# Patient Record
Sex: MALE | Race: WHITE | NOT HISPANIC OR LATINO | Employment: OTHER | URBAN - METROPOLITAN AREA
[De-identification: names, ages, dates, MRNs, and addresses within clinical notes are randomized per-mention and may not be internally consistent; named-entity substitution may affect disease eponyms.]

---

## 2018-04-12 ENCOUNTER — OFFICE VISIT (OUTPATIENT)
Dept: FAMILY MEDICINE | Facility: CLINIC | Age: 83
End: 2018-04-12
Payer: MEDICARE

## 2018-04-12 VITALS
HEART RATE: 70 BPM | RESPIRATION RATE: 18 BRPM | OXYGEN SATURATION: 100 % | SYSTOLIC BLOOD PRESSURE: 142 MMHG | DIASTOLIC BLOOD PRESSURE: 87 MMHG

## 2018-04-12 DIAGNOSIS — R21 RASH: Primary | ICD-10-CM

## 2018-04-12 PROCEDURE — 99202 OFFICE O/P NEW SF 15 MIN: CPT | Mod: PBBFAC,PN | Performed by: FAMILY MEDICINE

## 2018-04-12 PROCEDURE — 99213 OFFICE O/P EST LOW 20 MIN: CPT | Mod: S$PBB,,, | Performed by: FAMILY MEDICINE

## 2018-04-12 PROCEDURE — 99999 PR PBB SHADOW E&M-NEW PATIENT-LVL II: CPT | Mod: PBBFAC,,, | Performed by: FAMILY MEDICINE

## 2018-04-12 RX ORDER — VENLAFAXINE 37.5 MG/1
TABLET ORAL
COMMUNITY
End: 2019-02-08 | Stop reason: SDUPTHER

## 2018-04-12 RX ORDER — BACLOFEN 10 MG/1
TABLET ORAL
COMMUNITY
End: 2020-05-15

## 2018-04-12 RX ORDER — FERROUS SULFATE 325(65) MG
TABLET ORAL
COMMUNITY
End: 2020-05-15

## 2018-04-12 RX ORDER — HYDROCORTISONE 25 MG/G
CREAM TOPICAL
COMMUNITY
Start: 2018-03-15 | End: 2020-05-15

## 2018-04-12 RX ORDER — NYSTATIN 100000 U/G
OINTMENT TOPICAL 2 TIMES DAILY
Qty: 30 G | Refills: 1 | Status: ON HOLD | OUTPATIENT
Start: 2018-04-12 | End: 2020-10-24

## 2018-04-12 RX ORDER — CYANOCOBALAMIN (VITAMIN B-12) 1000MCG/15
LIQUID (ML) ORAL
COMMUNITY

## 2018-04-12 RX ORDER — TRIAMCINOLONE ACETONIDE 1 MG/G
CREAM TOPICAL
COMMUNITY
End: 2020-05-15

## 2018-04-12 RX ORDER — NYSTATIN 100000 [USP'U]/G
POWDER TOPICAL
COMMUNITY
End: 2018-04-12

## 2018-04-12 RX ORDER — ATORVASTATIN CALCIUM 20 MG/1
TABLET, FILM COATED ORAL
COMMUNITY
End: 2018-10-23 | Stop reason: SDUPTHER

## 2018-04-12 RX ORDER — AMLODIPINE BESYLATE 5 MG/1
TABLET ORAL
COMMUNITY
End: 2018-10-23 | Stop reason: SDUPTHER

## 2018-04-12 RX ORDER — IBUPROFEN 600 MG/1
TABLET ORAL
COMMUNITY
End: 2020-05-15

## 2018-04-12 NOTE — PROGRESS NOTES
Subjective:       Patient ID: Naty Hope is a 83 y.o. male.    Chief Complaint: Rash (x6 months)    Complains of red rash in groin region, pruritic, no fever, no drainage.      Review of Systems   Constitutional: Negative for activity change, appetite change, fatigue and fever.   HENT: Negative for congestion, dental problem, ear discharge, hearing loss, postnasal drip, sinus pain, sneezing and trouble swallowing.    Eyes: Negative for photophobia and discharge.   Respiratory: Negative for apnea, cough and shortness of breath.    Cardiovascular: Negative for chest pain.   Gastrointestinal: Negative for abdominal distention, abdominal pain, blood in stool, diarrhea, nausea and vomiting.   Endocrine: Negative for cold intolerance.   Genitourinary: Negative for difficulty urinating, flank pain, frequency, hematuria and testicular pain.   Musculoskeletal: Negative for arthralgias and myalgias.   Skin: Positive for rash. Negative for color change.   Allergic/Immunologic: Negative for environmental allergies, food allergies and immunocompromised state.   Neurological: Negative for dizziness, syncope, numbness and headaches.   Hematological: Negative for adenopathy. Does not bruise/bleed easily.   Psychiatric/Behavioral: Negative for agitation, confusion, decreased concentration, hallucinations, self-injury and sleep disturbance.   All other systems reviewed and are negative.      Past Medical History:   Diagnosis Date    Chronic fatigue     Hypertension      Past Surgical History:   Procedure Laterality Date    CHOLECYSTECTOMY      HERNIA REPAIR       Social History     Social History    Marital status:      Spouse name: N/A    Number of children: N/A    Years of education: N/A     Occupational History    Not on file.     Social History Main Topics    Smoking status: Never Smoker    Smokeless tobacco: Not on file    Alcohol use Yes    Drug use: No    Sexual activity: Not on file     Other  Topics Concern    Not on file     Social History Narrative    No narrative on file     No family history on file.    Objective:      BP (!) 142/87 (BP Location: Right arm, Patient Position: Sitting)   Pulse 70   Resp 18   SpO2 100%   Physical Exam   Constitutional: He is oriented to person, place, and time. He appears well-developed and well-nourished. No distress.   HENT:   Head: Normocephalic and atraumatic.   Nose: Nose normal.   Mouth/Throat: Oropharynx is clear and moist. No oropharyngeal exudate.   Eyes: Conjunctivae and EOM are normal. Pupils are equal, round, and reactive to light.   Neck: Normal range of motion. No thyromegaly present.   Cardiovascular: Normal rate, regular rhythm, normal heart sounds and intact distal pulses.  Exam reveals no gallop and no friction rub.    No murmur heard.  Pulmonary/Chest: Effort normal and breath sounds normal. No respiratory distress. He has no wheezes. He has no rales.   Abdominal: Soft. He exhibits no distension. There is no tenderness. There is no guarding.   Musculoskeletal: Normal range of motion. He exhibits no edema, tenderness or deformity.   Neurological: He is alert and oriented to person, place, and time. He displays normal reflexes. No cranial nerve deficit or sensory deficit. He exhibits normal muscle tone. Coordination normal.   Skin: Skin is warm and dry. Rash noted. He is not diaphoretic. No erythema. No pallor.   Psychiatric: He has a normal mood and affect. His behavior is normal. Judgment and thought content normal.   Nursing note and vitals reviewed.      Assessment:       1. Rash        Plan:       Rash  -     nystatin (MYCOSTATIN) ointment; Apply topically 2 (two) times daily.  Dispense: 30 g; Refill: 1            Risks, benefits, and side effects were discussed with the patient. All questions were answered to the fullest satisfaction of the patient, and pt verbalized understanding and agreement to treatment plan. Pt was to call with any new  or worsening symptoms, or present to the ER.

## 2018-10-23 RX ORDER — ATORVASTATIN CALCIUM 20 MG/1
20 TABLET, FILM COATED ORAL DAILY
Qty: 90 TABLET | Refills: 3 | Status: SHIPPED | OUTPATIENT
Start: 2018-10-23 | End: 2020-02-10

## 2018-10-23 RX ORDER — AMLODIPINE BESYLATE 5 MG/1
5 TABLET ORAL DAILY
Qty: 90 TABLET | Refills: 3 | Status: SHIPPED | OUTPATIENT
Start: 2018-10-23 | End: 2019-11-08 | Stop reason: SDUPTHER

## 2019-02-04 ENCOUNTER — OFFICE VISIT (OUTPATIENT)
Dept: FAMILY MEDICINE | Facility: CLINIC | Age: 84
End: 2019-02-04
Payer: MEDICARE

## 2019-02-04 VITALS
OXYGEN SATURATION: 97 % | BODY MASS INDEX: 25.2 KG/M2 | DIASTOLIC BLOOD PRESSURE: 83 MMHG | WEIGHT: 180 LBS | HEIGHT: 71 IN | HEART RATE: 69 BPM | SYSTOLIC BLOOD PRESSURE: 142 MMHG | RESPIRATION RATE: 20 BRPM

## 2019-02-04 DIAGNOSIS — R21 RASH: Primary | ICD-10-CM

## 2019-02-04 PROCEDURE — 99999 PR PBB SHADOW E&M-EST. PATIENT-LVL III: ICD-10-PCS | Mod: PBBFAC,,, | Performed by: NURSE PRACTITIONER

## 2019-02-04 PROCEDURE — 99213 OFFICE O/P EST LOW 20 MIN: CPT | Mod: PBBFAC,PN | Performed by: NURSE PRACTITIONER

## 2019-02-04 PROCEDURE — 99213 OFFICE O/P EST LOW 20 MIN: CPT | Mod: S$PBB,,, | Performed by: NURSE PRACTITIONER

## 2019-02-04 PROCEDURE — 99213 PR OFFICE/OUTPT VISIT, EST, LEVL III, 20-29 MIN: ICD-10-PCS | Mod: S$PBB,,, | Performed by: NURSE PRACTITIONER

## 2019-02-04 PROCEDURE — 99999 PR PBB SHADOW E&M-EST. PATIENT-LVL III: CPT | Mod: PBBFAC,,, | Performed by: NURSE PRACTITIONER

## 2019-02-04 RX ORDER — METHYLPREDNISOLONE 4 MG/1
TABLET ORAL
Qty: 1 PACKAGE | Refills: 0 | Status: SHIPPED | OUTPATIENT
Start: 2019-02-04 | End: 2019-02-25

## 2019-02-04 RX ORDER — DEXAMETHASONE SODIUM PHOSPHATE 4 MG/ML
8 INJECTION, SOLUTION INTRA-ARTICULAR; INTRALESIONAL; INTRAMUSCULAR; INTRAVENOUS; SOFT TISSUE
Status: DISCONTINUED | OUTPATIENT
Start: 2019-02-04 | End: 2020-05-15

## 2019-02-04 RX ORDER — KETOCONAZOLE 20 MG/G
CREAM TOPICAL DAILY
Qty: 2 TUBE | Refills: 0 | Status: SHIPPED | OUTPATIENT
Start: 2019-02-04 | End: 2020-05-15

## 2019-02-04 NOTE — PROGRESS NOTES
Chief Complaint  Chief Complaint   Patient presents with    Rash     legs and back       HPI:  Naty Hope is a 83 y.o. male with medical diagnoses as listed and reviewed within the medical history and problem list that presents for complaints of a rash to both legs at the ankle and to both hips. The rash is not red, but does itch and is slightly raised. He reports that he isn't sure if he was exposed to anything, but [place]. He does not have any hives or other areas that is seen.     PAST MEDICAL HISTORY:  Past Medical History:   Diagnosis Date    Chronic fatigue     Hypertension        PAST SURGICAL HISTORY:  Past Surgical History:   Procedure Laterality Date    CHOLECYSTECTOMY      HERNIA REPAIR         SOCIAL HISTORY:  Social History     Socioeconomic History    Marital status:      Spouse name: Not on file    Number of children: Not on file    Years of education: Not on file    Highest education level: Not on file   Social Needs    Financial resource strain: Not on file    Food insecurity - worry: Not on file    Food insecurity - inability: Not on file    Transportation needs - medical: Not on file    Transportation needs - non-medical: Not on file   Occupational History    Not on file   Tobacco Use    Smoking status: Never Smoker   Substance and Sexual Activity    Alcohol use: Yes    Drug use: No    Sexual activity: Not on file   Other Topics Concern    Not on file   Social History Narrative    Not on file       FAMILY HISTORY:  History reviewed. No pertinent family history.    ALLERGIES AND MEDICATIONS: updated and reviewed.  Review of patient's allergies indicates:  No Known Allergies  Current Outpatient Medications   Medication Sig Dispense Refill    amLODIPine (NORVASC) 5 MG tablet Take 1 tablet (5 mg total) by mouth once daily. 90 tablet 3    atorvastatin (LIPITOR) 20 MG tablet Take 1 tablet (20 mg total) by mouth once daily. 90 tablet 3  "   venlafaxine (EFFEXOR) 37.5 MG Tab venlafaxine 37.5 mg tablet      AMLODIPINE/ATORVASTATIN (CADUET ORAL) Take by mouth.      baclofen (LIORESAL) 10 MG tablet baclofen 10 mg tablet      cyanocobalamin, vitamin B-12, 1,000 mcg/15 mL Liqd cyanocobalamin (vit B-12) 1,000 mcg/mL injection solution   give 1 milliliter intramuscularly EVERY MONTH      DESVENLAFAXINE SUCCINATE (PRISTIQ ORAL) Take by mouth.      ferrous sulfate 325 mg (65 mg iron) Tab tablet ferrous sulfate 325 mg (65 mg iron) tablet   TAKE 1 TABLET THREE TIMES A DAY      hydrocortisone 2.5 % cream       ibuprofen (ADVIL,MOTRIN) 600 MG tablet ibuprofen 600 mg tablet   TAKE 1 TABLET EVERY 8 HOURS WITH FOOD NOT TO EXCEED 3200MG PER DAY      ketoconazole (NIZORAL) 2 % cream Apply topically once daily. 2 Tube 0    methylPREDNISolone (MEDROL DOSEPACK) 4 mg tablet use as directed 1 Package 0    nystatin (MYCOSTATIN) ointment Apply topically 2 (two) times daily. 30 g 1    syringe with needle (BD LUER-TORREY SYRINGE) 3 mL 25 gauge x 1" Syrg BD Luer-Torrey Syringe 3 mL 25 gauge x 1"   inject as directed      triamcinolone acetonide 0.1% (KENALOG) 0.1 % cream triamcinolone acetonide 0.1 % topical cream       Current Facility-Administered Medications   Medication Dose Route Frequency Provider Last Rate Last Dose    dexamethasone injection 8 mg  8 mg Intramuscular 1 time in Clinic/HOD [place] NP             ROS  Review of Systems   Constitutional: Negative for appetite change, chills, fatigue and fever.   HENT: Negative for congestion, postnasal drip, rhinorrhea and sore throat.    Respiratory: Negative for cough, chest tightness, shortness of breath and wheezing.    Cardiovascular: Negative for chest pain and palpitations.   Gastrointestinal: Negative for abdominal distention, abdominal pain, constipation, diarrhea, nausea and vomiting.   Genitourinary: Negative for dysuria.   Musculoskeletal: Negative for myalgias.   Skin: Positive for rash. " "Negative for color change.   Neurological: Negative for dizziness, weakness and headaches.   Psychiatric/Behavioral: Negative for confusion and sleep disturbance. The patient is not nervous/anxious.            PHYSICAL EXAM  Vitals:    02/04/19 1515   BP: (!) 142/83   BP Location: Left arm   Patient Position: Sitting   Pulse: 69   Resp: 20   SpO2: 97%   Weight: 81.6 kg (180 lb)   Height: 5' 11" (1.803 m)    Body mass index is 25.1 kg/m².  Weight: 81.6 kg (180 lb)   Height: 5' 11" (180.3 cm)       Physical Exam   Constitutional: He is oriented to person, place, and time. He appears well-developed and well-nourished.   HENT:   Head: Normocephalic.   Eyes: Pupils are equal, round, and reactive to light.   Neck: Normal range of motion. Neck supple.   Cardiovascular: Normal rate, S1 normal and S2 normal.   Pulmonary/Chest: Effort normal.   Abdominal: Normal appearance.   Musculoskeletal: Normal range of motion.   Neurological: He is alert and oriented to person, place, and time.   Skin: Skin is warm and dry. Capillary refill takes less than 2 seconds.        Psychiatric: He has a normal mood and affect. His speech is normal and behavior is normal. Thought content normal. Cognition and memory are normal.   Vitals reviewed.        Health Maintenance       Date Due Completion Date    TETANUS VACCINE 02/12/1953 ---    Zoster Vaccine 02/12/1997 ---    Pneumococcal Vaccine (65+ Low/Medium Risk) (1 of 2 - PCV13) 02/12/2002 ---    Influenza Vaccine 08/01/2018 ---    Lipid Panel 11/21/2021 11/21/2016               Assessment & Plan    Naty was seen today for rash.    Diagnoses and all orders for this visit:    Rash  -     dexamethasone injection 8 mg  -     methylPREDNISolone (MEDROL DOSEPACK) 4 mg tablet; use as directed  -     ketoconazole (NIZORAL) 2 % cream; Apply topically once daily.        Follow-up: Follow-up if symptoms worsen or fail to improve.      Risks, benefits, and side effects were discussed with the patient. " All questions were answered to the fullest satisfaction of the patient, and pt verbalized understanding and agreement to treatment plan. Pt was to call with any new or worsening symptoms, or present to the ER.

## 2019-02-08 RX ORDER — VENLAFAXINE 37.5 MG/1
TABLET ORAL
Qty: 30 TABLET | Refills: 6 | Status: SHIPPED | OUTPATIENT
Start: 2019-02-08 | End: 2019-04-12 | Stop reason: SDUPTHER

## 2019-04-12 RX ORDER — VENLAFAXINE 37.5 MG/1
TABLET ORAL
Qty: 90 TABLET | Refills: 3 | Status: SHIPPED | OUTPATIENT
Start: 2019-04-12 | End: 2020-05-15 | Stop reason: SDUPTHER

## 2019-11-08 RX ORDER — AMLODIPINE BESYLATE 5 MG/1
TABLET ORAL
Qty: 270 TABLET | Refills: 3 | Status: SHIPPED | OUTPATIENT
Start: 2019-11-08 | End: 2020-05-15 | Stop reason: SDUPTHER

## 2019-12-03 ENCOUNTER — CLINICAL SUPPORT (OUTPATIENT)
Dept: FAMILY MEDICINE | Facility: CLINIC | Age: 84
End: 2019-12-03
Payer: MEDICARE

## 2019-12-03 NOTE — PROGRESS NOTES
"Patient brought to room #4, brought picture from [facility] of impacted wax in Lt ear that needs removal for fitting of hearing aide.  Patient draped, procedure explained to patient to decrease any apprehension, Lt ear flushed with warm water and a dime size dark brown hard piece of ? Wax was flushed from ear canal, patient stated he felt relief instantly "like something popped out". Rt ear was rinsed also with a small amount of light yellowish debris noted in basin. Patient satisfied with results. Denies complaints, assisted off exam table, encouraged to call with any other problems or complaints. Equipment cleaned per protocol and placed to dry.  "

## 2020-02-10 RX ORDER — ATORVASTATIN CALCIUM 20 MG/1
TABLET, FILM COATED ORAL
Qty: 90 TABLET | Refills: 3 | Status: SHIPPED | OUTPATIENT
Start: 2020-02-10 | End: 2020-05-15 | Stop reason: SDUPTHER

## 2020-03-16 ENCOUNTER — IMMUNIZATION (OUTPATIENT)
Dept: FAMILY MEDICINE | Facility: CLINIC | Age: 85
End: 2020-03-16
Payer: MEDICARE

## 2020-03-16 PROCEDURE — 99999 PR PBB SHADOW E&M-EST. PATIENT-LVL I: CPT | Mod: PBBFAC,,,

## 2020-03-16 PROCEDURE — 99211 OFF/OP EST MAY X REQ PHY/QHP: CPT | Mod: PBBFAC,PN

## 2020-03-16 PROCEDURE — 99999 PR PBB SHADOW E&M-EST. PATIENT-LVL I: ICD-10-PCS | Mod: PBBFAC,,,

## 2020-03-16 NOTE — PROGRESS NOTES
Irrigated bilateral ear without incident.    Advised patient that he can purchase OTC ear irrigation tools, use warm water, and peroxide for home ear irrigation.    Patient verbalized understanding.

## 2020-05-14 ENCOUNTER — TELEPHONE (OUTPATIENT)
Dept: FAMILY MEDICINE | Facility: CLINIC | Age: 85
End: 2020-05-14

## 2020-05-14 RX ORDER — ATORVASTATIN CALCIUM 20 MG/1
20 TABLET, FILM COATED ORAL DAILY
Qty: 90 TABLET | Refills: 3 | Status: CANCELLED | OUTPATIENT
Start: 2020-05-14

## 2020-05-14 RX ORDER — AMLODIPINE BESYLATE 5 MG/1
5 TABLET ORAL DAILY
Qty: 270 TABLET | Refills: 3 | Status: CANCELLED | OUTPATIENT
Start: 2020-05-14

## 2020-05-14 RX ORDER — VENLAFAXINE 37.5 MG/1
TABLET ORAL
Qty: 90 TABLET | Refills: 3 | Status: CANCELLED | OUTPATIENT
Start: 2020-05-14

## 2020-05-14 NOTE — TELEPHONE ENCOUNTER
----- Message from Breana Bailey sent at 5/14/2020 12:51 PM CDT -----  Contact: self  Type:  RX Refill Request    Who Called:  self  Refill or New Rx:  refil  RX Name and Strength:    atorvastatin (LIPITOR) 20 MG tablet  venlafaxine (EFFEXOR) 37.5 MG Tab  amLODIPine  How is the patient currently taking it? (ex. 1XDay):    Is this a 30 day or 90 day RX:  90  Preferred Pharmacy with phone number:     [facility] in [place]    Local or Mail Order:  local  Ordering Provider:  Dr Ben Leon Call Back Number:  [phone] (home)   Additional Information:  Please call patient to confirm pharmacy and to discuss medications before sending. Thanks!

## 2020-05-14 NOTE — TELEPHONE ENCOUNTER
----- Message from Azalea Hernandez NP sent at 5/14/2020  1:52 PM CDT -----  He has not been seen in 2 years. He needs a f/u for medication refills

## 2020-05-15 ENCOUNTER — OFFICE VISIT (OUTPATIENT)
Dept: FAMILY MEDICINE | Facility: CLINIC | Age: 85
End: 2020-05-15
Payer: MEDICARE

## 2020-05-15 VITALS
DIASTOLIC BLOOD PRESSURE: 71 MMHG | WEIGHT: 172 LBS | BODY MASS INDEX: 24.08 KG/M2 | SYSTOLIC BLOOD PRESSURE: 111 MMHG | TEMPERATURE: 88 F | OXYGEN SATURATION: 96 % | HEIGHT: 71 IN | HEART RATE: 82 BPM | RESPIRATION RATE: 18 BRPM

## 2020-05-15 DIAGNOSIS — E78.49 OTHER HYPERLIPIDEMIA: Primary | ICD-10-CM

## 2020-05-15 PROCEDURE — 99999 PR PBB SHADOW E&M-EST. PATIENT-LVL III: ICD-10-PCS | Mod: PBBFAC,,, | Performed by: NURSE PRACTITIONER

## 2020-05-15 PROCEDURE — 99213 OFFICE O/P EST LOW 20 MIN: CPT | Mod: PBBFAC,PN | Performed by: NURSE PRACTITIONER

## 2020-05-15 PROCEDURE — 99214 PR OFFICE/OUTPT VISIT, EST, LEVL IV, 30-39 MIN: ICD-10-PCS | Mod: S$PBB,,, | Performed by: NURSE PRACTITIONER

## 2020-05-15 PROCEDURE — 99999 PR PBB SHADOW E&M-EST. PATIENT-LVL III: CPT | Mod: PBBFAC,,, | Performed by: NURSE PRACTITIONER

## 2020-05-15 PROCEDURE — 99214 OFFICE O/P EST MOD 30 MIN: CPT | Mod: S$PBB,,, | Performed by: NURSE PRACTITIONER

## 2020-05-15 RX ORDER — ATORVASTATIN CALCIUM 20 MG/1
20 TABLET, FILM COATED ORAL DAILY
Qty: 90 TABLET | Refills: 3 | Status: SHIPPED | OUTPATIENT
Start: 2020-05-15

## 2020-05-15 RX ORDER — AMLODIPINE BESYLATE 5 MG/1
5 TABLET ORAL DAILY
Qty: 270 TABLET | Refills: 3 | Status: SHIPPED | OUTPATIENT
Start: 2020-05-15

## 2020-05-15 RX ORDER — VENLAFAXINE 37.5 MG/1
TABLET ORAL
Qty: 90 TABLET | Refills: 3 | Status: SHIPPED | OUTPATIENT
Start: 2020-05-15 | End: 2020-05-18 | Stop reason: SDUPTHER

## 2020-05-15 NOTE — PROGRESS NOTES
Subjective:       Patient ID: Naty Hope is a 85 y.o. male.    Chief Complaint: Medication Refill    Mr. Naty Hope is a 85 year old male who presents to the clinic today for medication refill. Hx of hyperlipidemia. Last lipid not up to date. In regards to the patient's dyslipidemia, patient reports has been compliant with the medication regimen  without side effects. Hx of HTN. hypertension well controlled. Goal of <130/80. Meds and labs as per orders. Requesting refill of venlafaxine. Reports medication is effective at improving mood. Denies any si/hi.       HM:  - lipid: ordered  - reports he had 11 vial of blood work completed at Dr. Garcia office. Will fax for results    Review of Systems   Constitutional: Negative for activity change, appetite change, chills, fatigue, fever and unexpected weight change.   HENT: Negative for congestion, ear pain, postnasal drip, sore throat and tinnitus.    Eyes: Negative for pain and visual disturbance.   Respiratory: Negative for apnea, cough, chest tightness, shortness of breath and wheezing.    Cardiovascular: Negative for chest pain, palpitations and leg swelling.   Gastrointestinal: Positive for constipation. Negative for abdominal distention, abdominal pain, blood in stool, diarrhea, nausea and vomiting.   Endocrine: Negative for polydipsia and polyuria.   Genitourinary: Negative for difficulty urinating, flank pain, frequency and urgency.   Musculoskeletal: Negative for arthralgias, back pain, joint swelling and myalgias.   Skin: Negative for color change, pallor and rash.   Allergic/Immunologic: Negative for environmental allergies and food allergies.   Neurological: Negative for dizziness, tremors, syncope, weakness, light-headedness and headaches.   Hematological: Does not bruise/bleed easily.   Psychiatric/Behavioral: Negative for agitation, decreased concentration, self-injury, sleep disturbance and suicidal ideas. The patient is not  "nervous/anxious.          Reviewed family, medical, surgical, and social history.    Objective:      /71 (BP Location: Left arm, Patient Position: Sitting, BP Method: Medium (Automatic))   Pulse 82   Temp (!) 87.9 °F (31.1 °C) (Oral)   Resp 18   Ht 5' 11" (1.803 m)   Wt 78 kg (172 lb)   SpO2 96%   BMI 23.99 kg/m²   Physical Exam   Constitutional: He is oriented to person, place, and time. He appears well-developed and well-nourished. He is cooperative. No distress.   HENT:   Head: Normocephalic and atraumatic.   Nose: Nose normal.   Mouth/Throat: Uvula is midline, oropharynx is clear and moist and mucous membranes are normal. No uvula swelling.   Eyes: Pupils are equal, round, and reactive to light. Conjunctivae, EOM and lids are normal.   Neck: Trachea normal and full passive range of motion without pain. No tracheal tenderness present. No neck rigidity. No thyroid mass present.   Cardiovascular: Normal rate, regular rhythm, S1 normal, S2 normal, normal heart sounds, intact distal pulses and normal pulses.   Pulmonary/Chest: Effort normal and breath sounds normal. No respiratory distress. He has no decreased breath sounds. He has no wheezes.   Abdominal: Soft. Normal appearance and bowel sounds are normal. He exhibits no distension and no abdominal bruit. There is no guarding and no CVA tenderness.   Musculoskeletal: He exhibits no edema, tenderness or deformity.   Lymphadenopathy:     He has no cervical adenopathy.   Neurological: He is alert and oriented to person, place, and time. He has normal strength. He exhibits normal muscle tone.   Skin: Skin is warm, dry and intact. Capillary refill takes less than 2 seconds. No abrasion, no laceration, no lesion and no rash noted. He is not diaphoretic. No cyanosis. Nails show no clubbing.   Psychiatric: He has a normal mood and affect. His speech is normal and behavior is normal. Judgment and thought content normal. Cognition and memory are normal.     "   Assessment:       1. Other hyperlipidemia        Plan:       Other hyperlipidemia  -     Lipid Panel; Future; Expected date: 05/15/2020    Other orders  -     amLODIPine (NORVASC) 5 MG tablet; Take 1 tablet (5 mg total) by mouth once daily.  Dispense: 270 tablet; Refill: 3  -     atorvastatin (LIPITOR) 20 MG tablet; Take 1 tablet (20 mg total) by mouth once daily.  Dispense: 90 tablet; Refill: 3  -     venlafaxine (EFFEXOR) 37.5 MG Tab; venlafaxine 37.5 mg tablet  Dispense: 90 tablet; Refill: 3            Risks, benefits, and side effects were discussed with the patient. All questions were answered to the fullest satisfaction of the patient, and pt verbalized understanding and agreement to treatment plan. Pt was to call with any new or worsening symptoms, or present to the ER.

## 2020-05-18 RX ORDER — VENLAFAXINE 37.5 MG/1
37.5 TABLET ORAL 2 TIMES DAILY
Qty: 60 TABLET | Refills: 3 | Status: SHIPPED | OUTPATIENT
Start: 2020-05-18 | End: 2020-10-24

## 2020-05-20 ENCOUNTER — PATIENT MESSAGE (OUTPATIENT)
Dept: ADMINISTRATIVE | Facility: HOSPITAL | Age: 85
End: 2020-05-20

## 2020-06-11 ENCOUNTER — TELEPHONE (OUTPATIENT)
Dept: CARDIOLOGY | Facility: CLINIC | Age: 85
End: 2020-06-11

## 2020-06-11 NOTE — TELEPHONE ENCOUNTER
----- Message from Ermelinda Vogel sent at 6/11/2020 11:27 AM CDT -----  Contact: Pt  Name of Caller: Naty Hope  Reason for Visit/Symptoms: rash that has not gone away  Best Contact Number or Confirm if Mychart Preferred: callback at [phone]  Preferred Date/Time of Appointment: First available    Interested in Virtual Visit (yes/no): no  Additional Information:  Pt is requesting to see Dr Contreras or a male physician ASAP

## 2020-06-12 ENCOUNTER — OFFICE VISIT (OUTPATIENT)
Dept: FAMILY MEDICINE | Facility: CLINIC | Age: 85
End: 2020-06-12
Payer: MEDICARE

## 2020-06-12 VITALS
SYSTOLIC BLOOD PRESSURE: 131 MMHG | WEIGHT: 177 LBS | OXYGEN SATURATION: 95 % | HEART RATE: 42 BPM | TEMPERATURE: 98 F | HEIGHT: 71 IN | DIASTOLIC BLOOD PRESSURE: 75 MMHG | RESPIRATION RATE: 18 BRPM | BODY MASS INDEX: 24.78 KG/M2

## 2020-06-12 DIAGNOSIS — R21 RASH: Primary | ICD-10-CM

## 2020-06-12 PROCEDURE — 99999 PR PBB SHADOW E&M-EST. PATIENT-LVL III: CPT | Mod: PBBFAC,,, | Performed by: FAMILY MEDICINE

## 2020-06-12 PROCEDURE — 99999 PR PBB SHADOW E&M-EST. PATIENT-LVL III: ICD-10-PCS | Mod: PBBFAC,,, | Performed by: FAMILY MEDICINE

## 2020-06-12 PROCEDURE — 99213 OFFICE O/P EST LOW 20 MIN: CPT | Mod: PBBFAC,PN | Performed by: FAMILY MEDICINE

## 2020-06-12 PROCEDURE — 99213 PR OFFICE/OUTPT VISIT, EST, LEVL III, 20-29 MIN: ICD-10-PCS | Mod: S$PBB,,, | Performed by: FAMILY MEDICINE

## 2020-06-12 PROCEDURE — 99213 OFFICE O/P EST LOW 20 MIN: CPT | Mod: S$PBB,,, | Performed by: FAMILY MEDICINE

## 2020-06-12 RX ORDER — DOXYCYCLINE HYCLATE 100 MG
100 TABLET ORAL 2 TIMES DAILY
Qty: 20 TABLET | Refills: 0 | Status: SHIPPED | OUTPATIENT
Start: 2020-06-12 | End: 2020-06-26 | Stop reason: SDUPTHER

## 2020-06-12 NOTE — PROGRESS NOTES
"Subjective:       Patient ID: Naty Hope is a 85 y.o. male.    Chief Complaint: Rash (Patient states that this rash is ongoing. He had an abdominal mesh placed, unsure of when, and then developed sepsis.  The rash has been in his groin area ever since.)    Rash  Last few years  Recalcitrant to antifungals    Review of Systems   Constitutional: Negative for activity change, appetite change, chills, fatigue, fever and unexpected weight change.   HENT: Negative for congestion, ear pain, postnasal drip, sore throat and tinnitus.    Eyes: Negative for pain and visual disturbance.   Respiratory: Negative for apnea, cough, chest tightness, shortness of breath and wheezing.    Cardiovascular: Negative for chest pain, palpitations and leg swelling.   Gastrointestinal: Positive for constipation. Negative for abdominal distention, abdominal pain, blood in stool, diarrhea, nausea and vomiting.   Endocrine: Negative for polydipsia and polyuria.   Genitourinary: Negative for difficulty urinating, flank pain, frequency and urgency.   Musculoskeletal: Negative for arthralgias, back pain, joint swelling and myalgias.   Skin: Negative for color change, pallor and rash.   Allergic/Immunologic: Negative for environmental allergies and food allergies.   Neurological: Negative for dizziness, tremors, syncope, weakness, light-headedness and headaches.   Hematological: Does not bruise/bleed easily.   Psychiatric/Behavioral: Negative for agitation, decreased concentration, self-injury, sleep disturbance and suicidal ideas. The patient is not nervous/anxious.          Reviewed family, medical, surgical, and social history.    Objective:      /75 (BP Location: Left arm, Patient Position: Sitting, BP Method: Medium (Automatic))   Pulse (!) 42   Temp 97.6 °F (36.4 °C) (Oral)   Resp 18   Ht 5' 11" (1.803 m)   Wt 80.3 kg (177 lb)   SpO2 95%   BMI 24.69 kg/m²   Physical Exam   Constitutional: He is oriented to person, " place, and time. He appears well-developed and well-nourished. He is cooperative. No distress.   HENT:   Head: Normocephalic and atraumatic.   Nose: Nose normal.   Mouth/Throat: Uvula is midline, oropharynx is clear and moist and mucous membranes are normal. No uvula swelling.   Eyes: Pupils are equal, round, and reactive to light. Conjunctivae, EOM and lids are normal.   Neck: Trachea normal and full passive range of motion without pain. No tracheal tenderness present. No neck rigidity. No thyroid mass present.   Cardiovascular: Normal rate, regular rhythm, S1 normal, S2 normal, normal heart sounds, intact distal pulses and normal pulses.   Pulmonary/Chest: Effort normal and breath sounds normal. No respiratory distress. He has no decreased breath sounds. He has no wheezes.   Abdominal: Soft. Normal appearance and bowel sounds are normal. He exhibits no distension and no abdominal bruit. There is no guarding and no CVA tenderness.   Musculoskeletal: He exhibits no edema, tenderness or deformity.   Lymphadenopathy:     He has no cervical adenopathy.   Neurological: He is alert and oriented to person, place, and time. He has normal strength. He exhibits normal muscle tone.   Skin: Skin is warm, dry and intact. Capillary refill takes less than 2 seconds. No abrasion, no laceration, no lesion and no rash noted. He is not diaphoretic. No cyanosis. Nails show no clubbing.   Psychiatric: He has a normal mood and affect. His speech is normal and behavior is normal. Judgment and thought content normal. Cognition and memory are normal.       Assessment:       1. Rash        Plan:       Rash  -     doxycycline (VIBRA-TABS) 100 MG tablet; Take 1 tablet (100 mg total) by mouth 2 (two) times daily.  Dispense: 20 tablet; Refill: 0    as shown  Follow up after therapy  Most likely will need derm referral        Risks, benefits, and side effects were discussed with the patient. All questions were answered to the fullest  satisfaction of the patient, and pt verbalized understanding and agreement to treatment plan. Pt was to call with any new or worsening symptoms, or present to the ER.

## 2020-06-26 ENCOUNTER — OFFICE VISIT (OUTPATIENT)
Dept: FAMILY MEDICINE | Facility: CLINIC | Age: 85
End: 2020-06-26
Payer: MEDICARE

## 2020-06-26 VITALS
BODY MASS INDEX: 24.54 KG/M2 | OXYGEN SATURATION: 99 % | WEIGHT: 175.31 LBS | RESPIRATION RATE: 16 BRPM | HEIGHT: 71 IN | SYSTOLIC BLOOD PRESSURE: 116 MMHG | TEMPERATURE: 98 F | HEART RATE: 73 BPM | DIASTOLIC BLOOD PRESSURE: 77 MMHG

## 2020-06-26 DIAGNOSIS — R21 RASH: ICD-10-CM

## 2020-06-26 PROCEDURE — 99999 PR PBB SHADOW E&M-EST. PATIENT-LVL III: ICD-10-PCS | Mod: PBBFAC,,, | Performed by: FAMILY MEDICINE

## 2020-06-26 PROCEDURE — 99213 OFFICE O/P EST LOW 20 MIN: CPT | Mod: S$PBB,,, | Performed by: FAMILY MEDICINE

## 2020-06-26 PROCEDURE — 99213 OFFICE O/P EST LOW 20 MIN: CPT | Mod: PBBFAC,PN | Performed by: FAMILY MEDICINE

## 2020-06-26 PROCEDURE — 99999 PR PBB SHADOW E&M-EST. PATIENT-LVL III: CPT | Mod: PBBFAC,,, | Performed by: FAMILY MEDICINE

## 2020-06-26 PROCEDURE — 99213 PR OFFICE/OUTPT VISIT, EST, LEVL III, 20-29 MIN: ICD-10-PCS | Mod: S$PBB,,, | Performed by: FAMILY MEDICINE

## 2020-06-26 RX ORDER — DOXYCYCLINE HYCLATE 100 MG
100 TABLET ORAL 2 TIMES DAILY
Qty: 20 TABLET | Refills: 0 | Status: SHIPPED | OUTPATIENT
Start: 2020-06-26 | End: 2020-08-17

## 2020-06-26 NOTE — PROGRESS NOTES
"Subjective:       Patient ID: Naty Hope is a 85 y.o. male.    Chief Complaint: Follow-up (medication for rash)    Rash  Last few years  Recalcitrant to antifungals    Since the last visit, the rash has resolved    Review of Systems   Constitutional: Negative for activity change, appetite change, chills, fatigue, fever and unexpected weight change.   HENT: Negative for congestion, ear pain, postnasal drip, sore throat and tinnitus.    Eyes: Negative for pain and visual disturbance.   Respiratory: Negative for apnea, cough, chest tightness, shortness of breath and wheezing.    Cardiovascular: Negative for chest pain, palpitations and leg swelling.   Gastrointestinal: Positive for constipation. Negative for abdominal distention, abdominal pain, blood in stool, diarrhea, nausea and vomiting.   Endocrine: Negative for polydipsia and polyuria.   Genitourinary: Negative for difficulty urinating, flank pain, frequency and urgency.   Musculoskeletal: Negative for arthralgias, back pain, joint swelling and myalgias.   Skin: Negative for color change, pallor and rash.   Allergic/Immunologic: Negative for environmental allergies and food allergies.   Neurological: Negative for dizziness, tremors, syncope, weakness, light-headedness and headaches.   Hematological: Does not bruise/bleed easily.   Psychiatric/Behavioral: Negative for agitation, decreased concentration, self-injury, sleep disturbance and suicidal ideas. The patient is not nervous/anxious.          Reviewed family, medical, surgical, and social history.    Objective:      /77 (BP Location: Left arm, Patient Position: Sitting, BP Method: Medium (Automatic))   Pulse 73   Temp 97.9 °F (36.6 °C) (Oral)   Resp 16   Ht 5' 11" (1.803 m)   Wt 79.5 kg (175 lb 4.8 oz)   SpO2 99%   BMI 24.45 kg/m²   Physical Exam  Constitutional:       General: He is not in acute distress.     Appearance: Normal appearance. He is well-developed. He is not diaphoretic. " Anesthetic History   No history of anesthetic complications            Review of Systems / Medical History  Patient summary reviewed, nursing notes reviewed and pertinent labs reviewed    Pulmonary  Within defined limits                 Neuro/Psych   Within defined limits           Cardiovascular  Within defined limits                     GI/Hepatic/Renal  Within defined limits              Endo/Other  Within defined limits           Other Findings              Physical Exam    Airway  Mallampati: II  TM Distance: > 6 cm  Neck ROM: normal range of motion   Mouth opening: Normal     Cardiovascular  Regular rate and rhythm,  S1 and S2 normal,  no murmur, click, rub, or gallop             Dental    Dentition: Upper dentition intact and Lower dentition intact     Pulmonary  Breath sounds clear to auscultation               Abdominal  GI exam deferred       Other Findings            Anesthetic Plan    ASA: 1  Anesthesia type: general          Induction: Intravenous  Anesthetic plan and risks discussed with: Patient   HENT:      Head: Normocephalic and atraumatic.      Nose: Nose normal.      Mouth/Throat:      Pharynx: Uvula midline. No uvula swelling.   Eyes:      General: Lids are normal.      Conjunctiva/sclera: Conjunctivae normal.      Pupils: Pupils are equal, round, and reactive to light.   Neck:      Musculoskeletal: Full passive range of motion without pain. No neck rigidity.      Thyroid: No thyroid mass.      Trachea: Trachea normal. No tracheal tenderness.   Cardiovascular:      Rate and Rhythm: Normal rate and regular rhythm.      Pulses: Normal pulses.      Heart sounds: Normal heart sounds, S1 normal and S2 normal.   Pulmonary:      Effort: Pulmonary effort is normal. No respiratory distress.      Breath sounds: Normal breath sounds. No decreased breath sounds or wheezing.   Abdominal:      General: Bowel sounds are normal. There is no distension or abdominal bruit.      Palpations: Abdomen is soft.      Tenderness: There is no guarding.   Musculoskeletal:         General: No tenderness or deformity.   Lymphadenopathy:      Cervical: No cervical adenopathy.   Skin:     General: Skin is warm and dry.      Capillary Refill: Capillary refill takes less than 2 seconds.      Findings: No abrasion, laceration, lesion or rash.      Nails: There is no clubbing.     Neurological:      Mental Status: He is alert and oriented to person, place, and time.      Motor: No abnormal muscle tone.   Psychiatric:         Speech: Speech normal.         Behavior: Behavior normal. Behavior is cooperative.         Thought Content: Thought content normal.         Judgment: Judgment normal.         Assessment:       1. Rash        Plan:       Rash  -     doxycycline (VIBRA-TABS) 100 MG tablet; Take 1 tablet (100 mg total) by mouth 2 (two) times daily.  Dispense: 20 tablet; Refill: 0            Risks, benefits, and side effects were discussed with the patient. All questions were answered to the fullest satisfaction of the patient, and pt  verbalized understanding and agreement to treatment plan. Pt was to call with any new or worsening symptoms, or present to the ER.

## 2020-08-17 ENCOUNTER — OFFICE VISIT (OUTPATIENT)
Dept: FAMILY MEDICINE | Facility: CLINIC | Age: 85
End: 2020-08-17
Payer: MEDICARE

## 2020-08-17 VITALS
BODY MASS INDEX: 24.64 KG/M2 | TEMPERATURE: 98 F | OXYGEN SATURATION: 96 % | DIASTOLIC BLOOD PRESSURE: 61 MMHG | HEART RATE: 82 BPM | RESPIRATION RATE: 18 BRPM | WEIGHT: 176 LBS | SYSTOLIC BLOOD PRESSURE: 104 MMHG | HEIGHT: 71 IN

## 2020-08-17 DIAGNOSIS — E78.49 OTHER HYPERLIPIDEMIA: Primary | ICD-10-CM

## 2020-08-17 DIAGNOSIS — I10 ESSENTIAL HYPERTENSION: ICD-10-CM

## 2020-08-17 PROCEDURE — 99213 OFFICE O/P EST LOW 20 MIN: CPT | Mod: PBBFAC,PN | Performed by: NURSE PRACTITIONER

## 2020-08-17 PROCEDURE — 99999 PR PBB SHADOW E&M-EST. PATIENT-LVL III: ICD-10-PCS | Mod: PBBFAC,,, | Performed by: NURSE PRACTITIONER

## 2020-08-17 PROCEDURE — 99999 PR PBB SHADOW E&M-EST. PATIENT-LVL III: CPT | Mod: PBBFAC,,, | Performed by: NURSE PRACTITIONER

## 2020-08-17 PROCEDURE — 99213 OFFICE O/P EST LOW 20 MIN: CPT | Mod: S$PBB,,, | Performed by: NURSE PRACTITIONER

## 2020-08-17 PROCEDURE — 99213 PR OFFICE/OUTPT VISIT, EST, LEVL III, 20-29 MIN: ICD-10-PCS | Mod: S$PBB,,, | Performed by: NURSE PRACTITIONER

## 2020-08-17 NOTE — PROGRESS NOTES
Subjective:       Patient ID: Naty Hope is a 85 y.o. male.    Chief Complaint: Follow-up (3 month)    Mr. Naty Hope is a 85 year old male who presents to the clinic today for f/u. Doing well. No new concerns. Rash disappeared after stop wearing blue jeans. In regards to the patient's dyslipidemia, patient reports has been compliant with the medication regimen  without side effects. In regards to the patient's hypertension, patient denies chest pain/sob, and has been compliant with the medication regimen. hypertension well controlled. Goal of <130/80. Meds and labs as per orders.               Review of Systems   Constitutional: Negative for activity change, appetite change, chills, fatigue, fever and unexpected weight change.   HENT: Negative for congestion, ear pain, postnasal drip, sore throat and tinnitus.    Eyes: Negative for pain and visual disturbance.   Respiratory: Negative for apnea, cough, chest tightness, shortness of breath and wheezing.    Cardiovascular: Negative for chest pain, palpitations and leg swelling.   Gastrointestinal: Positive for constipation. Negative for abdominal distention, abdominal pain, blood in stool, diarrhea, nausea and vomiting.   Endocrine: Negative for polydipsia and polyuria.   Genitourinary: Negative for difficulty urinating, flank pain, frequency and urgency.   Musculoskeletal: Negative for arthralgias, back pain, joint swelling and myalgias.   Skin: Negative for color change, pallor and rash.   Allergic/Immunologic: Negative for environmental allergies and food allergies.   Neurological: Negative for dizziness, tremors, syncope, weakness, light-headedness and headaches.   Hematological: Does not bruise/bleed easily.   Psychiatric/Behavioral: Negative for agitation, decreased concentration, self-injury, sleep disturbance and suicidal ideas. The patient is not nervous/anxious.          Reviewed family, medical, surgical, and social history.    Objective:  "     /61 (BP Location: Left arm, Patient Position: Sitting, BP Method: Medium (Automatic))   Pulse 82   Temp 98.2 °F (36.8 °C) (Tympanic)   Resp 18   Ht 5' 11" (1.803 m)   Wt 79.8 kg (176 lb)   SpO2 96%   BMI 24.55 kg/m²   Physical Exam  Constitutional:       General: He is not in acute distress.     Appearance: Normal appearance. He is well-developed. He is not diaphoretic.   HENT:      Head: Normocephalic and atraumatic.      Nose: Nose normal.      Mouth/Throat:      Pharynx: Uvula midline. No uvula swelling.   Eyes:      General: Lids are normal.      Conjunctiva/sclera: Conjunctivae normal.      Pupils: Pupils are equal, round, and reactive to light.   Neck:      Musculoskeletal: Full passive range of motion without pain. No neck rigidity.      Thyroid: No thyroid mass.      Trachea: Trachea normal. No tracheal tenderness.   Cardiovascular:      Rate and Rhythm: Normal rate and regular rhythm.      Pulses: Normal pulses.      Heart sounds: Normal heart sounds, S1 normal and S2 normal.   Pulmonary:      Effort: Pulmonary effort is normal. No respiratory distress.      Breath sounds: Normal breath sounds. No decreased breath sounds or wheezing.   Abdominal:      General: Bowel sounds are normal. There is no distension or abdominal bruit.      Palpations: Abdomen is soft.      Tenderness: There is no guarding.   Musculoskeletal:         General: No tenderness or deformity.   Lymphadenopathy:      Cervical: No cervical adenopathy.   Skin:     General: Skin is warm and dry.      Capillary Refill: Capillary refill takes less than 2 seconds.      Findings: No abrasion, laceration, lesion or rash.      Nails: There is no clubbing.     Neurological:      Mental Status: He is alert and oriented to person, place, and time.      Motor: No abnormal muscle tone.   Psychiatric:         Speech: Speech normal.         Behavior: Behavior normal. Behavior is cooperative.         Thought Content: Thought content " normal.         Judgment: Judgment normal.         Assessment:       1. Other hyperlipidemia    2. Essential hypertension        Plan:       Other hyperlipidemia    Essential hypertension    PLAN:  - Discussed with patient the plan of care  - Medications reviewed. Medication side effects discussed. Patient has no questions or concerns at this time. Informed patient to notify me regarding any concerns.   - Continue monitoring BP  - Informed patient to please notify me with any questions or concerns at anytime  - Follow up ordered for 6 months            Risks, benefits, and side effects were discussed with the patient. All questions were answered to the fullest satisfaction of the patient, and pt verbalized understanding and agreement to treatment plan. Pt was to call with any new or worsening symptoms, or present to the ER.

## 2020-08-20 DIAGNOSIS — I10 ESSENTIAL HYPERTENSION: ICD-10-CM

## 2020-10-24 ENCOUNTER — HOSPITAL ENCOUNTER (INPATIENT)
Facility: HOSPITAL | Age: 85
LOS: 9 days | Discharge: NURSING, REHAB OR LONG-TERM CARE | DRG: 871 | End: 2020-11-02
Attending: HOSPITALIST | Admitting: HOSPITALIST
Payer: MEDICARE

## 2020-10-24 ENCOUNTER — HOSPITAL ENCOUNTER (EMERGENCY)
Facility: HOSPITAL | Age: 85
End: 2020-10-24
Attending: EMERGENCY MEDICINE
Payer: MEDICARE

## 2020-10-24 VITALS
TEMPERATURE: 99 F | RESPIRATION RATE: 18 BRPM | OXYGEN SATURATION: 97 % | HEART RATE: 88 BPM | DIASTOLIC BLOOD PRESSURE: 74 MMHG | BODY MASS INDEX: 24.64 KG/M2 | WEIGHT: 176 LBS | HEIGHT: 71 IN | SYSTOLIC BLOOD PRESSURE: 132 MMHG

## 2020-10-24 DIAGNOSIS — I10 ESSENTIAL HYPERTENSION: ICD-10-CM

## 2020-10-24 DIAGNOSIS — E87.20 LACTIC ACIDOSIS: ICD-10-CM

## 2020-10-24 DIAGNOSIS — J12.82 PNEUMONIA DUE TO COVID-19 VIRUS: ICD-10-CM

## 2020-10-24 DIAGNOSIS — Z99.81 DEPENDENCE ON CONTINUOUS SUPPLEMENTAL OXYGEN: ICD-10-CM

## 2020-10-24 DIAGNOSIS — U07.1 PNEUMONIA DUE TO COVID-19 VIRUS: ICD-10-CM

## 2020-10-24 DIAGNOSIS — J12.82 PNEUMONIA DUE TO COVID-19 VIRUS: Primary | ICD-10-CM

## 2020-10-24 DIAGNOSIS — U07.1 PNEUMONIA DUE TO COVID-19 VIRUS: Primary | ICD-10-CM

## 2020-10-24 DIAGNOSIS — E78.5 HYPERLIPIDEMIA, UNSPECIFIED HYPERLIPIDEMIA TYPE: ICD-10-CM

## 2020-10-24 DIAGNOSIS — E86.1 HYPOTENSION DUE TO HYPOVOLEMIA: ICD-10-CM

## 2020-10-24 DIAGNOSIS — N17.9 ACUTE RENAL INJURY DUE TO HYPOVOLEMIA: ICD-10-CM

## 2020-10-24 DIAGNOSIS — J18.9 PNEUMONIA OF RIGHT UPPER LOBE DUE TO INFECTIOUS ORGANISM: ICD-10-CM

## 2020-10-24 DIAGNOSIS — J84.10 INTERSTITIAL PULMONARY FIBROSIS: ICD-10-CM

## 2020-10-24 DIAGNOSIS — J18.9 PNEUMONIA OF LEFT LOWER LOBE DUE TO INFECTIOUS ORGANISM: Primary | ICD-10-CM

## 2020-10-24 DIAGNOSIS — R55 SYNCOPE: ICD-10-CM

## 2020-10-24 DIAGNOSIS — R09.02 HYPOXIA: ICD-10-CM

## 2020-10-24 DIAGNOSIS — E86.1 ACUTE RENAL INJURY DUE TO HYPOVOLEMIA: ICD-10-CM

## 2020-10-24 DIAGNOSIS — J96.01 ACUTE HYPOXEMIC RESPIRATORY FAILURE: ICD-10-CM

## 2020-10-24 DIAGNOSIS — R74.01 TRANSAMINITIS: ICD-10-CM

## 2020-10-24 LAB
ALBUMIN SERPL BCP-MCNC: 3.8 G/DL (ref 3.5–5.2)
ALP SERPL-CCNC: 57 U/L (ref 55–135)
ALT SERPL W/O P-5'-P-CCNC: 67 U/L (ref 10–44)
ANION GAP SERPL CALC-SCNC: 13 MMOL/L (ref 8–16)
APTT BLDCRRT: 29.5 SEC (ref 21–32)
AST SERPL-CCNC: 86 U/L (ref 10–40)
BACTERIA #/AREA URNS HPF: ABNORMAL /HPF
BASOPHILS # BLD AUTO: 0.02 K/UL (ref 0–0.2)
BASOPHILS NFR BLD: 0.3 % (ref 0–1.9)
BILIRUB SERPL-MCNC: 1.2 MG/DL (ref 0.1–1)
BILIRUB UR QL STRIP: NEGATIVE
BNP SERPL-MCNC: 27 PG/ML (ref 0–99)
BUN SERPL-MCNC: 24 MG/DL (ref 8–23)
CALCIUM SERPL-MCNC: 9.1 MG/DL (ref 8.7–10.5)
CHLORIDE SERPL-SCNC: 99 MMOL/L (ref 95–110)
CK MB SERPL-MCNC: 2 NG/ML (ref 0.1–6.5)
CK MB SERPL-RTO: 2.3 % (ref 0–5)
CK SERPL-CCNC: 86 U/L (ref 20–200)
CLARITY UR: CLEAR
CO2 SERPL-SCNC: 22 MMOL/L (ref 23–29)
COLOR UR: YELLOW
CREAT SERPL-MCNC: 1.6 MG/DL (ref 0.5–1.4)
CRP SERPL-MCNC: 9.21 MG/DL (ref 0–0.75)
DIFFERENTIAL METHOD: ABNORMAL
EOSINOPHIL # BLD AUTO: 0.1 K/UL (ref 0–0.5)
EOSINOPHIL NFR BLD: 0.9 % (ref 0–8)
ERYTHROCYTE [DISTWIDTH] IN BLOOD BY AUTOMATED COUNT: 13.1 % (ref 11.5–14.5)
EST. GFR  (AFRICAN AMERICAN): 45.4 ML/MIN/1.73 M^2
EST. GFR  (NON AFRICAN AMERICAN): 39.3 ML/MIN/1.73 M^2
FERRITIN SERPL-MCNC: 574 NG/ML (ref 20–300)
GLUCOSE SERPL-MCNC: 151 MG/DL (ref 70–110)
GLUCOSE UR QL STRIP: NEGATIVE
HCT VFR BLD AUTO: 37.1 % (ref 40–54)
HGB BLD-MCNC: 12.5 G/DL (ref 14–18)
HGB UR QL STRIP: NEGATIVE
HYALINE CASTS #/AREA URNS LPF: ABNORMAL /LPF
IMM GRANULOCYTES # BLD AUTO: 0.08 K/UL (ref 0–0.04)
IMM GRANULOCYTES NFR BLD AUTO: 1.1 % (ref 0–0.5)
INR PPP: 1.1 (ref 0.8–1.2)
KETONES UR QL STRIP: NEGATIVE
LACTATE SERPL-SCNC: 2.8 MMOL/L (ref 0.5–2.2)
LEUKOCYTE ESTERASE UR QL STRIP: NEGATIVE
LYMPHOCYTES # BLD AUTO: 0.7 K/UL (ref 1–4.8)
LYMPHOCYTES NFR BLD: 8.6 % (ref 18–48)
MCH RBC QN AUTO: 33.5 PG (ref 27–31)
MCHC RBC AUTO-ENTMCNC: 33.7 G/DL (ref 32–36)
MCV RBC AUTO: 100 FL (ref 82–98)
MICROSCOPIC COMMENT: ABNORMAL
MONOCYTES # BLD AUTO: 0.5 K/UL (ref 0.3–1)
MONOCYTES NFR BLD: 6.3 % (ref 4–15)
NEUTROPHILS # BLD AUTO: 6.3 K/UL (ref 1.8–7.7)
NEUTROPHILS NFR BLD: 82.8 % (ref 38–73)
NITRITE UR QL STRIP: NEGATIVE
NRBC BLD-RTO: 0 /100 WBC
PH UR STRIP: 7 [PH] (ref 5–8)
PLATELET # BLD AUTO: 201 K/UL (ref 150–350)
PMV BLD AUTO: 10.4 FL (ref 9.2–12.9)
POTASSIUM SERPL-SCNC: 3.7 MMOL/L (ref 3.5–5.1)
PROT SERPL-MCNC: 7.4 G/DL (ref 6–8.4)
PROT UR QL STRIP: ABNORMAL
PROTHROMBIN TIME: 10.8 SEC (ref 9–12.5)
RBC # BLD AUTO: 3.73 M/UL (ref 4.6–6.2)
RBC #/AREA URNS HPF: 10 /HPF (ref 0–4)
SARS-COV-2 RDRP RESP QL NAA+PROBE: POSITIVE
SODIUM SERPL-SCNC: 134 MMOL/L (ref 136–145)
SP GR UR STRIP: 1.01 (ref 1–1.03)
SQUAMOUS #/AREA URNS HPF: ABNORMAL /HPF
TROPONIN I SERPL DL<=0.01 NG/ML-MCNC: 0.04 NG/ML (ref 0.02–0.5)
URN SPEC COLLECT METH UR: ABNORMAL
UROBILINOGEN UR STRIP-ACNC: 1 EU/DL
WBC # BLD AUTO: 7.56 K/UL (ref 3.9–12.7)
WBC #/AREA URNS HPF: 6 /HPF (ref 0–5)

## 2020-10-24 PROCEDURE — 87040 BLOOD CULTURE FOR BACTERIA: CPT | Mod: 59

## 2020-10-24 PROCEDURE — 63600175 PHARM REV CODE 636 W HCPCS: Performed by: STUDENT IN AN ORGANIZED HEALTH CARE EDUCATION/TRAINING PROGRAM

## 2020-10-24 PROCEDURE — 93005 ELECTROCARDIOGRAM TRACING: CPT

## 2020-10-24 PROCEDURE — U0002 COVID-19 LAB TEST NON-CDC: HCPCS

## 2020-10-24 PROCEDURE — 81000 URINALYSIS NONAUTO W/SCOPE: CPT

## 2020-10-24 PROCEDURE — 86140 C-REACTIVE PROTEIN: CPT

## 2020-10-24 PROCEDURE — 36415 COLL VENOUS BLD VENIPUNCTURE: CPT

## 2020-10-24 PROCEDURE — 27000221 HC OXYGEN, UP TO 24 HOURS

## 2020-10-24 PROCEDURE — 70450 CT HEAD/BRAIN W/O DYE: CPT | Mod: TC

## 2020-10-24 PROCEDURE — 96375 TX/PRO/DX INJ NEW DRUG ADDON: CPT

## 2020-10-24 PROCEDURE — 20600001 HC STEP DOWN PRIVATE ROOM

## 2020-10-24 PROCEDURE — 99285 EMERGENCY DEPT VISIT HI MDM: CPT | Mod: 25

## 2020-10-24 PROCEDURE — 71045 X-RAY EXAM CHEST 1 VIEW: CPT | Mod: TC,FY

## 2020-10-24 PROCEDURE — 82553 CREATINE MB FRACTION: CPT

## 2020-10-24 PROCEDURE — 85730 THROMBOPLASTIN TIME PARTIAL: CPT

## 2020-10-24 PROCEDURE — 96365 THER/PROPH/DIAG IV INF INIT: CPT

## 2020-10-24 PROCEDURE — 99223 PR INITIAL HOSPITAL CARE,LEVL III: ICD-10-PCS | Mod: ,,, | Performed by: STUDENT IN AN ORGANIZED HEALTH CARE EDUCATION/TRAINING PROGRAM

## 2020-10-24 PROCEDURE — 99223 1ST HOSP IP/OBS HIGH 75: CPT | Mod: ,,, | Performed by: STUDENT IN AN ORGANIZED HEALTH CARE EDUCATION/TRAINING PROGRAM

## 2020-10-24 PROCEDURE — 84484 ASSAY OF TROPONIN QUANT: CPT

## 2020-10-24 PROCEDURE — 82550 ASSAY OF CK (CPK): CPT

## 2020-10-24 PROCEDURE — 25000003 PHARM REV CODE 250: Performed by: STUDENT IN AN ORGANIZED HEALTH CARE EDUCATION/TRAINING PROGRAM

## 2020-10-24 PROCEDURE — 94761 N-INVAS EAR/PLS OXIMETRY MLT: CPT

## 2020-10-24 PROCEDURE — 96361 HYDRATE IV INFUSION ADD-ON: CPT

## 2020-10-24 PROCEDURE — 71045 XR CHEST AP PORTABLE: ICD-10-PCS | Mod: 26,,, | Performed by: RADIOLOGY

## 2020-10-24 PROCEDURE — 83605 ASSAY OF LACTIC ACID: CPT

## 2020-10-24 PROCEDURE — 85610 PROTHROMBIN TIME: CPT

## 2020-10-24 PROCEDURE — 25000003 PHARM REV CODE 250: Performed by: EMERGENCY MEDICINE

## 2020-10-24 PROCEDURE — 94640 AIRWAY INHALATION TREATMENT: CPT

## 2020-10-24 PROCEDURE — 82728 ASSAY OF FERRITIN: CPT

## 2020-10-24 PROCEDURE — 25000242 PHARM REV CODE 250 ALT 637 W/ HCPCS: Performed by: STUDENT IN AN ORGANIZED HEALTH CARE EDUCATION/TRAINING PROGRAM

## 2020-10-24 PROCEDURE — 99900035 HC TECH TIME PER 15 MIN (STAT)

## 2020-10-24 PROCEDURE — 70450 CT HEAD/BRAIN W/O DYE: CPT | Mod: 26,,, | Performed by: RADIOLOGY

## 2020-10-24 PROCEDURE — 83880 ASSAY OF NATRIURETIC PEPTIDE: CPT

## 2020-10-24 PROCEDURE — 63600175 PHARM REV CODE 636 W HCPCS: Performed by: EMERGENCY MEDICINE

## 2020-10-24 PROCEDURE — 85025 COMPLETE CBC W/AUTO DIFF WBC: CPT

## 2020-10-24 PROCEDURE — 70450 CT HEAD WITHOUT CONTRAST: ICD-10-PCS | Mod: 26,,, | Performed by: RADIOLOGY

## 2020-10-24 PROCEDURE — 80053 COMPREHEN METABOLIC PANEL: CPT

## 2020-10-24 PROCEDURE — 71045 X-RAY EXAM CHEST 1 VIEW: CPT | Mod: 26,,, | Performed by: RADIOLOGY

## 2020-10-24 RX ORDER — BENZONATATE 100 MG/1
100 CAPSULE ORAL 3 TIMES DAILY PRN
Status: DISCONTINUED | OUTPATIENT
Start: 2020-10-24 | End: 2020-11-02 | Stop reason: HOSPADM

## 2020-10-24 RX ORDER — LOPERAMIDE HYDROCHLORIDE 2 MG/1
2 CAPSULE ORAL EVERY 6 HOURS PRN
Status: DISCONTINUED | OUTPATIENT
Start: 2020-10-24 | End: 2020-11-02 | Stop reason: HOSPADM

## 2020-10-24 RX ORDER — LEVOFLOXACIN 750 MG/1
750 TABLET ORAL
Status: DISCONTINUED | OUTPATIENT
Start: 2020-10-26 | End: 2020-10-25

## 2020-10-24 RX ORDER — L. ACIDOPHILUS/L.BULGARICUS 100MM CELL
1 GRANULES IN PACKET (EA) ORAL 2 TIMES DAILY
Status: DISCONTINUED | OUTPATIENT
Start: 2020-10-24 | End: 2020-11-02 | Stop reason: HOSPADM

## 2020-10-24 RX ORDER — TALC
6 POWDER (GRAM) TOPICAL NIGHTLY PRN
Status: DISCONTINUED | OUTPATIENT
Start: 2020-10-24 | End: 2020-11-02 | Stop reason: HOSPADM

## 2020-10-24 RX ORDER — DEXAMETHASONE SODIUM PHOSPHATE 10 MG/ML
10 INJECTION INTRAMUSCULAR; INTRAVENOUS
Status: COMPLETED | OUTPATIENT
Start: 2020-10-24 | End: 2020-10-24

## 2020-10-24 RX ORDER — ASCORBIC ACID 500 MG
500 TABLET ORAL 2 TIMES DAILY
Status: DISCONTINUED | OUTPATIENT
Start: 2020-10-24 | End: 2020-11-02 | Stop reason: HOSPADM

## 2020-10-24 RX ORDER — IBUPROFEN 200 MG
24 TABLET ORAL
Status: DISCONTINUED | OUTPATIENT
Start: 2020-10-24 | End: 2020-11-02 | Stop reason: HOSPADM

## 2020-10-24 RX ORDER — ALBUTEROL SULFATE 90 UG/1
2 AEROSOL, METERED RESPIRATORY (INHALATION) EVERY 6 HOURS
Status: DISCONTINUED | OUTPATIENT
Start: 2020-10-24 | End: 2020-10-30

## 2020-10-24 RX ORDER — CHOLECALCIFEROL (VITAMIN D3) 25 MCG
1000 TABLET ORAL DAILY
Status: DISCONTINUED | OUTPATIENT
Start: 2020-10-25 | End: 2020-10-25

## 2020-10-24 RX ORDER — GLUCAGON 1 MG
1 KIT INJECTION
Status: DISCONTINUED | OUTPATIENT
Start: 2020-10-24 | End: 2020-11-02 | Stop reason: HOSPADM

## 2020-10-24 RX ORDER — ACETAMINOPHEN 325 MG/1
650 TABLET ORAL EVERY 8 HOURS PRN
Status: DISCONTINUED | OUTPATIENT
Start: 2020-10-24 | End: 2020-11-02 | Stop reason: HOSPADM

## 2020-10-24 RX ORDER — SODIUM CHLORIDE 0.9 % (FLUSH) 0.9 %
10 SYRINGE (ML) INJECTION
Status: DISCONTINUED | OUTPATIENT
Start: 2020-10-24 | End: 2020-11-02 | Stop reason: HOSPADM

## 2020-10-24 RX ORDER — LEVOFLOXACIN 5 MG/ML
750 INJECTION, SOLUTION INTRAVENOUS
Status: DISCONTINUED | OUTPATIENT
Start: 2020-10-25 | End: 2020-10-24

## 2020-10-24 RX ORDER — ONDANSETRON 8 MG/1
8 TABLET, ORALLY DISINTEGRATING ORAL EVERY 8 HOURS PRN
Status: DISCONTINUED | OUTPATIENT
Start: 2020-10-24 | End: 2020-11-02 | Stop reason: HOSPADM

## 2020-10-24 RX ORDER — VENLAFAXINE 37.5 MG/1
37.5 TABLET ORAL 2 TIMES DAILY
Status: DISCONTINUED | OUTPATIENT
Start: 2020-10-24 | End: 2020-11-02 | Stop reason: HOSPADM

## 2020-10-24 RX ORDER — HEPARIN SODIUM 5000 [USP'U]/ML
5000 INJECTION, SOLUTION INTRAVENOUS; SUBCUTANEOUS EVERY 8 HOURS
Status: DISCONTINUED | OUTPATIENT
Start: 2020-10-24 | End: 2020-10-25

## 2020-10-24 RX ORDER — IBUPROFEN 200 MG
16 TABLET ORAL
Status: DISCONTINUED | OUTPATIENT
Start: 2020-10-24 | End: 2020-11-02 | Stop reason: HOSPADM

## 2020-10-24 RX ORDER — CYANOCOBALAMIN (VITAMIN B-12) 250 MCG
250 TABLET ORAL DAILY
Status: DISCONTINUED | OUTPATIENT
Start: 2020-10-25 | End: 2020-11-02 | Stop reason: HOSPADM

## 2020-10-24 RX ORDER — POLYETHYLENE GLYCOL 3350 17 G/17G
17 POWDER, FOR SOLUTION ORAL 2 TIMES DAILY PRN
Status: DISCONTINUED | OUTPATIENT
Start: 2020-10-24 | End: 2020-11-02 | Stop reason: HOSPADM

## 2020-10-24 RX ORDER — LEVOFLOXACIN 5 MG/ML
750 INJECTION, SOLUTION INTRAVENOUS
Status: DISCONTINUED | OUTPATIENT
Start: 2020-10-24 | End: 2020-10-24 | Stop reason: HOSPADM

## 2020-10-24 RX ORDER — AMLODIPINE BESYLATE 5 MG/1
5 TABLET ORAL DAILY
Status: DISCONTINUED | OUTPATIENT
Start: 2020-10-25 | End: 2020-11-02 | Stop reason: HOSPADM

## 2020-10-24 RX ORDER — ATORVASTATIN CALCIUM 20 MG/1
20 TABLET, FILM COATED ORAL DAILY
Status: DISCONTINUED | OUTPATIENT
Start: 2020-10-25 | End: 2020-10-25

## 2020-10-24 RX ADMIN — DEXAMETHASONE SODIUM PHOSPHATE 10 MG: 10 INJECTION INTRAMUSCULAR; INTRAVENOUS at 12:10

## 2020-10-24 RX ADMIN — SODIUM CHLORIDE 2394 ML: 9 INJECTION, SOLUTION INTRAVENOUS at 10:10

## 2020-10-24 RX ADMIN — ALBUTEROL SULFATE 2 PUFF: 90 AEROSOL, METERED RESPIRATORY (INHALATION) at 09:10

## 2020-10-24 RX ADMIN — HEPARIN SODIUM 5000 UNITS: 5000 INJECTION INTRAVENOUS; SUBCUTANEOUS at 09:10

## 2020-10-24 RX ADMIN — OXYCODONE HYDROCHLORIDE AND ACETAMINOPHEN 500 MG: 500 TABLET ORAL at 08:10

## 2020-10-24 RX ADMIN — LEVOFLOXACIN 750 MG: 750 INJECTION, SOLUTION INTRAVENOUS at 11:10

## 2020-10-24 RX ADMIN — LACTOBACILLUS ACIDOPHILUS / LACTOBACILLUS BULGARICUS 1 EACH: 100 MILLION CFU STRENGTH GRANULES at 09:10

## 2020-10-24 RX ADMIN — VENLAFAXINE 37.5 MG: 37.5 TABLET ORAL at 08:10

## 2020-10-24 RX ADMIN — IPRATROPIUM BROMIDE 2 PUFF: 17 AEROSOL, METERED RESPIRATORY (INHALATION) at 09:10

## 2020-10-24 NOTE — PLAN OF CARE
Outside Transfer Acceptance Note / Regional Referral Center    Date of acceptance: 10/24/2020    Referring facility/ provider: [facility] Dr. Tessa Hughes     Reason for transfer: Higher level of care /  COVID pneumonia    Report from referring provider:    85-year-old m with a PMH chronic fatigue and HTN presented to [facility] via EMS for emergent evaluation of multiple near syncopal episodes, recurrent falls and bowel and urinary incontinence.  ROS otherwise unremarkable.  In the ED BP 86/69 T 99.1° HR 86 R 18 SpO2 82% (RA) GCS 15.  WBC 7.5 Hb 12.5 Na 134 Cr 1.6 BNP 27 LA 2.8 COVID pos.  CXR pos for infiltrate at the left base and probably at the right upper lung field. Patient given IVF 30 cc/kilogram and O2 4 L nasal catheter with improvement in BP to 120/58 and SpO2 to high 90s.  Patient also given dexamethasone 10 mg IV.  CTH showed no acute changes. Also, BC x 2. Patient started on levofloxacin. Patient lives alone in the [facility]. This was first time out of his home in one week - to go shopping. This is third case from [facility] today      Temp:  [99.1 °F (37.3 °C)]   Pulse:  [86-94]   Resp:  [18-20]   BP: ()/(58-73)   SpO2:  [82 %-98 %]     Recent Labs   Lab 10/24/20  1025   WBC 7.56   HGB 12.5*   HCT 37.1*        Recent Labs   Lab 10/24/20  1025   *   K 3.7   CL 99   CO2 22*   BUN 24*   CREATININE 1.6*   *   CALCIUM 9.1     Recent Labs   Lab 10/24/20  1025   ALKPHOS 57   ALT 67*   AST 86*   ALBUMIN 3.8   PROT 7.4   BILITOT 1.2*   INR 1.1       To Do List:    Admit to HM /COVID team    Upon patient arrival to floor, please send SecureChat to [facility] or call extension [phone] (if no answer, this will flip to a beeper, so enter your call back number) for Hospital Medicine admit team assignment and for additional admit orders for the patient.  Do not page the attending physician associated with the patient on arrival (this physician may not be  on duty at the time of arrival).  Rather, always call [phone] to reach the triage physician for orders and team assignment.    Kalin Quigley MD [facility]/ Hospital Medicine

## 2020-10-24 NOTE — H&P
[facility] Hospital Medicine                                                         History and Physical       Team: Networked reference to record PCT  Nathanael Hamilton MD   Admit Date: 10/24/2020   HOD: 0     CHECO:      Primary care Physician: Primary Doctor No    Principal Problem: COVID-19 pneumonia     Patient information was obtained from patient, past medical records and ER records.      Code status: Full Code      HPI:       Mr Naty Hope is an 85-year-old m with a PMH chronic fatigue and HTN presented to [facility] via EMS for emergent evaluation of multiple near syncopal episodes and a fall.  Pt reports that he started to have symptoms including myalgias, cough, lightheadedness, fatigue starting 4 days ago. Was seen at an urgent care at that time. Was started on a course of azithromycin. The following day, about 3 days ago, he was called and told that the result of a SARSCOV2 test was positive. On the day of presentation he left his house for the first time in days, experienced near syncope and fell, scraping his right arm against a wall on the way down. A bystander called EMS. Pt reportedly incontinent of urine and stool en route. Pt denies loss of consciousness. Denies recent sick contacts. No fever, chills, nausea, diarrhea. Reports poor appetite over past several days. Has never had a similar event.     In the ED BP 86/69 T 99.1° HR 86 R 18 SpO2 82% (RA) GCS 15.  WBC 7.5 Hb 12.5 Na 134 Cr 1.6 BNP 27 LA 2.8 COVID pos.  CXR pos for infiltrate at the left base and probably at the right upper lung field. Patient given IVF 30 cc/kilogram and O2 4 L nasal catheter with improvement in BP to 120/58 and SpO2 to high 90s.  Patient also given dexamethasone 10 mg IV.  CTH showed no acute changes. Also, BC x 2. Patient started on levofloxacin.     Vital signs upon transfer arrival all normal  except pt requiring 3 L NC. Pt comfortable appearing at this time.       Review of Systems:  Constitutional: fever, malaise, weakness  CV: No chest pain, edema, palpitations  Resp: SOB, cough, sputum production  GI: No changes in appetite, NVDC, pain, melena, hematochezia, GERD, hematemesis  : No Dysuria, hematuria, urinary urgency, frequency  MSK: No arthralgia/myalgia, joint swelling  Neuro/Psych: No anxiety, depression    PAST HISTORY:     Past Medical History:   Diagnosis Date    Chronic fatigue     Hypertension        Past Surgical History:   Procedure Laterality Date    CHOLECYSTECTOMY      HERNIA REPAIR         No family history on file.    Social History     Socioeconomic History    Marital status:      Spouse name: Not on file    Number of children: Not on file    Years of education: Not on file    Highest education level: Not on file   Occupational History    Not on file   Social Needs    Financial resource strain: Not on file    Food insecurity     Worry: Not on file     Inability: Not on file    Transportation needs     Medical: Not on file     Non-medical: Not on file   Tobacco Use    Smoking status: Never Smoker   Substance and Sexual Activity    Alcohol use: Yes    Drug use: No    Sexual activity: Not Currently   Lifestyle    Physical activity     Days per week: Not on file     Minutes per session: Not on file    Stress: Not on file   Relationships    Social connections     Talks on phone: Not on file     Gets together: Not on file     Attends Jew service: Not on file     Active member of club or organization: Not on file     Attends meetings of clubs or organizations: Not on file     Relationship status: Not on file   Other Topics Concern    Not on file   Social History Narrative    Not on file         MEDICATIONS and ALLERGIES:   Reviewed    Current Facility-Administered Medications on File Prior to Encounter   Medication Dose Route Frequency Provider Last Rate  "Last Dose    [COMPLETED] dexAMETHasone sodium phos (PF) injection 10 mg  10 mg Intravenous ED 1 Time Tessa Hughes MD   10 mg at 10/24/20 1258    [COMPLETED] sodium chloride 0.9% bolus 2,394 mL  30 mL/kg Intravenous ED 1 Time Tessa Hughes MD   Stopped at 10/24/20 1304    [DISCONTINUED] levoFLOXacin 750 mg/150 mL IVPB 750 mg  750 mg Intravenous Q24H Tessa Hughes MD   Stopped at 10/24/20 1255     Current Outpatient Medications on File Prior to Encounter   Medication Sig Dispense Refill    amLODIPine (NORVASC) 5 MG tablet Take 1 tablet (5 mg total) by mouth once daily. 270 tablet 3    atorvastatin (LIPITOR) 20 MG tablet Take 1 tablet (20 mg total) by mouth once daily. 90 tablet 3    cyanocobalamin, vitamin B-12, 1,000 mcg/15 mL Liqd cyanocobalamin (vit B-12) 1,000 mcg/mL injection solution   give 1 milliliter intramuscularly EVERY MONTH      nystatin (MYCOSTATIN) ointment Apply topically 2 (two) times daily. (Patient not taking: Reported on 8/17/2020) 30 g 1    venlafaxine (EFFEXOR) 37.5 MG Tab Take 1 tablet (37.5 mg total) by mouth 2 (two) times daily. venlafaxine 37.5 mg tablet 60 tablet 3        Review of patient's allergies indicates:  No Known Allergies      Physical Exam:    No intake or output data in the 24 hours ending 10/24/20 1740  Wt Readings from Last 3 Encounters:   10/24/20 79.8 kg (175 lb 14.8 oz)   10/24/20 79.8 kg (176 lb)   08/17/20 79.8 kg (176 lb)       /74 (BP Location: Right arm, Patient Position: Lying)   Pulse 75   Temp 98.2 °F (36.8 °C) (Oral)   Resp 18   Ht 5' 11" (1.803 m)   Wt 79.8 kg (175 lb 14.8 oz)   SpO2 97%   BMI 24.54 kg/m²     General appearance: no distress  Mental status: Alert and oriented x 3  HEENT:  conjunctivae/corneas clear, PERRL  Neck: supple, thyroid not enlarged  Pulm:   normal respiratory effort. Crackles over RLL field. Comfortable on 3 L NC.   Card: RRR, S1, S2 normal, no murmur, click, rub or gallop  Abd: soft, NT, ND, BS present; no " masses, no organomegaly  Ext: no c/c/e  Pulses: 2+, symmetric  Skin: color, texture, turgor normal. Abrasions over R forearm.   Neuro: Cranial Nerves grossly intact, no focal numbness or weakness, normal strength and tone       Laboratory:  Lab Results   Component Value Date    [id number] Positive (A) 10/24/2020       Recent Labs   Lab 10/24/20  1025   WBC 7.56   LYMPH 8.6*  0.7*   HGB 12.5*   HCT 37.1*        Recent Labs   Lab 10/24/20  1025   *   K 3.7   CL 99   CO2 22*   BUN 24*   CREATININE 1.6*   *   CALCIUM 9.1     Recent Labs   Lab 10/24/20  1025   ALKPHOS 57   ALT 67*   AST 86*   ALBUMIN 3.8   PROT 7.4   BILITOT 1.2*   INR 1.1        Recent Labs     10/24/20  1025 10/24/20  1125   FERRITIN  --  574*   CRP 9.21*  --    BNP 27  --    TROPONINI 0.04  --        All labs within the last 24 hours were reviewed.     Microbiology:  Microbiology Results (last 7 days)     ** No results found for the last 168 hours. **            Imaging      No results found for this or any previous visit.    CT Head Without Contrast  Narrative: EXAMINATION:  CT HEAD WITHOUT CONTRAST    CLINICAL HISTORY:  Syncope, recurrent;Dizziness, persistent/recurrent, cardiac or vascular cause suspected;    TECHNIQUE:  Low dose axial images were obtained through the head.  Coronal and sagittal reformations were also performed. Contrast was not administered.    COMPARISON:  Head CT of November 28, 2013.    FINDINGS:  No cranial fracture is identified.  Scalp edema or hematoma is not noted.  The internal auditory canals appear sharp and symmetric.    The basal cisterns are clear and symmetric.  There is no mass effect or midline shift.  There is mild enlargement of the lateral ventricles cerebral sulci and sylvian fissures consistent with brain atrophy.  Within the brain parenchyma no hyper or hypodense lesions consistent with tumor, edema, CVA or hemorrhage is seen.  No intracranial hemorrhage or hematoma is  noted.  Impression: Mild brain atrophy otherwise negative head CT.    Electronically signed by: Rahul Bar MD  Date:    10/24/2020  Time:    11:25  X-Ray Chest AP Portable  Narrative: EXAMINATION:  XR CHEST AP PORTABLE    CLINICAL HISTORY:  Sepsis;    TECHNIQUE:  Single frontal view of the chest was performed.    COMPARISON:  Chest of November 28, 2013.    FINDINGS:  The mediastinal and cardiac size and contours are normal.  There is an infiltrate at the left lung base and probably at the right upper lung field as well.  There is also increased interstitial fibrotic markings throughout the lung fields bilaterally a probable underlying interstitial fibrosis.  A solid mass is not seen.  There is no pneumothorax or pleural effusion.  Impression: Focal increased density in the right upper lobe and the left lung base consistent with probable pneumonia.  Underlying interstitial fibrotic changes bilaterally.    Electronically signed by: Rahul Bar MD  Date:    10/24/2020  Time:    10:44      All imaging within the last 24 hours was reviewed.     Active Hospital Problems    Diagnosis  POA    Pneumonia due to COVID-19 virus [U07.1, J12.89]  Yes      Resolved Hospital Problems   No resolved problems to display.           ASSESSMENT and PLAN:     Problems List:  Active Hospital Problems    Diagnosis  POA    Pneumonia due to COVID-19 virus [U07.1, J12.89]  Yes      Resolved Hospital Problems   No resolved problems to display.           COVID-19 Virus Infection:  Viral Pneumonia due to COVID-19:  -Pt tested for COVID-19 and noted to be positive on 10/24  -Isolation: Airborne/Droplet. Surgical mask on patient. Notify Infection Control  -[facility]  -Monitoring: Telemetry & Continuous Pulse Oximetry  -Antibiotics: Started on levofloxacin at previous hospital. Will continue course.  -Nutrition:    -Multivitamin PO daily   -Add Boost supplement   -Vitamin D 1000IU daily  if deficient   -Ascorbic acid 500mg PO bid  -Supportive Care:   -Acetaminophen 650mg PO Q6hr PRN fever/headache   -Loperamide PRN viral diarrhea   -Robitussin, tessalon perls for cough   -IVF if indicated, restrictive strategy preferred, no maintenance IV if able   -Investigational Therapies:  -Atorvastatin 40mg po daily   -Due to hypoxia, pt started on dexamethasone 6mg PO daily up to 10 days or until pt is discharged from hospital (whichever is sooner)   -Pt meets criteria for remdesivir. Pt provided verbal consent to start this medication and pharmacy consulted.     Acute Hypoxemic Respiratory Failure  -RT consult via Respiratory Communication for COVID Protocols  -If wheezing, albuterol INH Q6h scheduled & PRN  -Incentive Spirometer Q4h  -Flutter Valve Q4h  -Continuous pulse oximetry; titrate oxygen to keep sats between 92-96%  -Wean off O2 as tolerated    -Supplemental O2 via LFNC, VentiMask, or HFNC (see Respiratory Support Oxygen Therapies)  -Proning Protocol if patient is a candidate with GCS >13 and able to self-prone (see HM Proning Protocol)  -If deterioration, may warrant trial of NIPPV and transfer to neg pressure room or immediate ICU consult    Presyncope with fall  -f/u orthostatics  -unlikely heart failure related as BNP completely normal  -continuous cardiac monitoring for now  -f/u PT recs    Macrocytic anemia  -c/w b12 supplementation. Oral inplace of home monthly injection  -f/u folate level    HTN  -c/w home amlodipine    Chronic fatigue syndrome  -c/w home venlafaxine     Goals of care, counseling/discussion  -Reviewed the typical clinical course of COVID19 with patient, including the potential for acute decompensation requiring intubation and mechanical ventilation  -Pt currently maintains code status of full code. Pt is going to reflect on this and determine if he wishes to change status.       VTE High Risk Prophylaxis: heparin subq q8h  Dispo: DC home once medically ready.         Time  spent in care of patient: > 30 minutes    Nathanael Hamilton MD  Hospital Medicine Staff  Pager [phone]

## 2020-10-24 NOTE — ED NOTES
Pt incontinent of stool and urine pta. Perineal care provided and linens changed with assist from Grace RODRIGUEZ.

## 2020-10-24 NOTE — ED PROVIDER NOTES
Encounter Date: 10/24/2020       History     Chief Complaint   Patient presents with    Fatigue     multiple near syncopal episodes and falls noted by bystanders at [employer]     85-year-old male with past medical history significant for chronic fatigue and hypertension presents to the ED via EMS for emergent evaluation of multiple near syncopal episodes, recurrent falls, and bowel and urinary incontinence.  The patient was shopping at a local [employer] in [place], [place] and was seen by bystanders at [employer] to have experienced multiple near syncopal episodes with subsequent falling.  Denies striking his head, LOC.  Denies chest pain, dyspnea, palpitations, diaphoresis.  Denies fever, chills, previous illness, known sick contacts.  Denies nausea, vomiting, constipation.    Arrives to the ED with GCS of 15, hypotensive, hypoxic (immediately placed on 4L NC), and pale.        Review of patient's allergies indicates:  No Known Allergies  Past Medical History:   Diagnosis Date    Chronic fatigue     Hypertension      Past Surgical History:   Procedure Laterality Date    CHOLECYSTECTOMY      HERNIA REPAIR       History reviewed. No pertinent family history.  Social History     Tobacco Use    Smoking status: Never Smoker   Substance Use Topics    Alcohol use: Yes    Drug use: No     Review of Systems   Constitutional: Positive for fatigue. Negative for appetite change, chills, diaphoresis and fever.   HENT: Negative for congestion, ear pain, rhinorrhea, sinus pressure, sinus pain, sore throat and tinnitus.    Eyes: Negative for photophobia and visual disturbance.   Respiratory: Negative for cough, chest tightness, shortness of breath and wheezing.    Cardiovascular: Negative for chest pain, palpitations and leg swelling.   Gastrointestinal: Positive for diarrhea. Negative for abdominal pain, constipation, nausea and vomiting.   Endocrine: Negative for cold intolerance, heat intolerance, polydipsia,  polyphagia and polyuria.   Genitourinary: Negative for decreased urine volume, difficulty urinating, dysuria, flank pain, frequency, hematuria and urgency.        Urinary incontinence   Musculoskeletal: Negative for arthralgias, back pain, gait problem, joint swelling, myalgias, neck pain and neck stiffness.   Skin: Negative for color change, pallor, rash and wound.   Allergic/Immunologic: Negative for immunocompromised state.   Neurological: Positive for dizziness, syncope and weakness. Negative for light-headedness, numbness and headaches.   Hematological: Negative for adenopathy. Does not bruise/bleed easily.   Psychiatric/Behavioral: Negative for decreased concentration, dysphoric mood and sleep disturbance. The patient is not nervous/anxious.    All other systems reviewed and are negative.      Physical Exam     Initial Vitals [10/24/20 1018]   BP Pulse Resp Temp SpO2   (!) 86/69 86 18 99.1 °F (37.3 °C) 98 %      MAP       --         Physical Exam    Nursing note and vitals reviewed.  Constitutional: He appears well-developed and well-nourished. He is not diaphoretic. He has a sickly appearance. He appears ill. No distress.   HENT:   Head: Normocephalic and atraumatic.   Right Ear: External ear normal.   Left Ear: External ear normal.   Nose: Nose normal.   Mouth/Throat: Oropharynx is clear and moist.   Eyes: Conjunctivae are normal. Pupils are equal, round, and reactive to light. No scleral icterus.   Neck: Normal range of motion. Neck supple. No JVD present.   Cardiovascular: Normal rate, regular rhythm, normal heart sounds and intact distal pulses.   Pulmonary/Chest: No tachypnea. No respiratory distress. He has wheezes. He has rhonchi. He has no rales. He exhibits no tenderness.   Abdominal: Soft. Bowel sounds are normal. He exhibits no distension. There is no abdominal tenderness. There is no rebound and no guarding.   Musculoskeletal: Normal range of motion. No tenderness or edema.   Lymphadenopathy:      He has no cervical adenopathy.   Neurological: He is alert and oriented to person, place, and time. GCS score is 15. GCS eye subscore is 4. GCS verbal subscore is 5. GCS motor subscore is 6.   Skin: Skin is warm and dry. Capillary refill takes less than 2 seconds. No rash and no abscess noted. No erythema. No pallor.   Psychiatric: He has a normal mood and affect. His behavior is normal. Judgment and thought content normal.         ED Course   Critical Care    Date/Time: 10/24/2020 10:23 AM  Performed by: Tessa Hughes MD  Authorized by: Tessa Hughes MD   Direct patient critical care time: 95 minutes  Additional history critical care time: 25 minutes  Ordering / reviewing critical care time: 35 minutes  Documentation critical care time: 30 minutes  Consulting other physicians critical care time: 30 minutes  Total critical care time (exclusive of procedural time) : 215 minutes  Critical care time was exclusive of separately billable procedures and treating other patients and teaching time.  Critical care was necessary to treat or prevent imminent or life-threatening deterioration of the following conditions: CNS failure or compromise, circulatory failure and respiratory failure.  Critical care was time spent personally by me on the following activities: discussions with consultants, evaluation of patient's response to treatment, obtaining history from patient or surrogate, ordering and review of laboratory studies, pulse oximetry, review of old charts, re-evaluation of patient's condition, ordering and review of radiographic studies, ordering and performing treatments and interventions, examination of patient, interpretation of cardiac output measurements and development of treatment plan with patient or surrogate.        Labs Reviewed   CBC W/ AUTO DIFFERENTIAL - Abnormal; Notable for the following components:       Result Value    RBC 3.73 (*)     Hemoglobin 12.5 (*)     Hematocrit 37.1 (*)      (*)      MCH 33.5 (*)     Immature Granulocytes 1.1 (*)     Immature Grans (Abs) 0.08 (*)     Lymph # 0.7 (*)     Gran % 82.8 (*)     Lymph % 8.6 (*)     All other components within normal limits   COMPREHENSIVE METABOLIC PANEL - Abnormal; Notable for the following components:    Sodium 134 (*)     CO2 22 (*)     Glucose 151 (*)     BUN 24 (*)     Creatinine 1.6 (*)     Total Bilirubin 1.2 (*)     AST 86 (*)     ALT 67 (*)     eGFR if  45.4 (*)     eGFR if non  39.3 (*)     All other components within normal limits   URINALYSIS, REFLEX TO URINE CULTURE - Abnormal; Notable for the following components:    Protein, UA 1+ (*)     All other components within normal limits    Narrative:     Specimen Source->Urine   SARS-COV-2 RNA AMPLIFICATION, QUAL - Abnormal; Notable for the following components:    SARS-CoV-2 RNA, Amplification, Qual Positive (*)     All other components within normal limits   LACTIC ACID, PLASMA - Abnormal; Notable for the following components:    Lactate (Lactic Acid) 2.8 (*)     All other components within normal limits   FERRITIN - Abnormal; Notable for the following components:    Ferritin 574 (*)     All other components within normal limits   C-REACTIVE PROTEIN - Abnormal; Notable for the following components:    CRP 9.21 (*)     All other components within normal limits   URINALYSIS MICROSCOPIC - Abnormal; Notable for the following components:    RBC, UA 10 (*)     WBC, UA 6 (*)     Bacteria Few (*)     Hyaline Casts, UA Moderate (*)     All other components within normal limits    Narrative:     Specimen Source->Urine   CULTURE, BLOOD    Narrative:     Aerobic and anaerobic   CULTURE, BLOOD    Narrative:     Aerobic and anaerobic   APTT   PROTIME-INR   TROPONIN I   B-TYPE NATRIURETIC PEPTIDE   C-REACTIVE PROTEIN   FERRITIN   LACTATE DEHYDROGENASE   CK   CK-MB     EKG Readings: (Independently Interpreted)   Initial Reading: No STEMI. Rhythm: Normal Sinus Rhythm. Heart  Rate: 91. Ectopy: No Ectopy. Conduction: Normal. ST Segments: Normal ST Segments. T Waves: Normal. Axis: Normal. Clinical Impression: Normal Sinus Rhythm       Imaging Results          CT Head Without Contrast (Final result)  Result time 10/24/20 11:25:59    Final result by Rahul Bar Jr., MD (10/24/20 11:25:59)                 Impression:      Mild brain atrophy otherwise negative head CT.      Electronically signed by: Rahul Bar MD  Date:    10/24/2020  Time:    11:25             Narrative:    EXAMINATION:  CT HEAD WITHOUT CONTRAST    CLINICAL HISTORY:  Syncope, recurrent;Dizziness, persistent/recurrent, cardiac or vascular cause suspected;    TECHNIQUE:  Low dose axial images were obtained through the head.  Coronal and sagittal reformations were also performed. Contrast was not administered.    COMPARISON:  Head CT of November 28, 2013.    FINDINGS:  No cranial fracture is identified.  Scalp edema or hematoma is not noted.  The internal auditory canals appear sharp and symmetric.    The basal cisterns are clear and symmetric.  There is no mass effect or midline shift.  There is mild enlargement of the lateral ventricles cerebral sulci and sylvian fissures consistent with brain atrophy.  Within the brain parenchyma no hyper or hypodense lesions consistent with tumor, edema, CVA or hemorrhage is seen.  No intracranial hemorrhage or hematoma is noted.                               X-Ray Chest AP Portable (Final result)  Result time 10/24/20 10:44:48    Final result by Rahul Bar Jr., MD (10/24/20 10:44:48)                 Impression:      Focal increased density in the right upper lobe and the left lung base consistent with probable pneumonia.  Underlying interstitial fibrotic changes bilaterally.      Electronically signed by: Rahul Bar MD  Date:    10/24/2020  Time:    10:44             Narrative:    EXAMINATION:  XR CHEST AP PORTABLE    CLINICAL  HISTORY:  Sepsis;    TECHNIQUE:  Single frontal view of the chest was performed.    COMPARISON:  Chest of November 28, 2013.    FINDINGS:  The mediastinal and cardiac size and contours are normal.  There is an infiltrate at the left lung base and probably at the right upper lung field as well.  There is also increased interstitial fibrotic markings throughout the lung fields bilaterally a probable underlying interstitial fibrosis.  A solid mass is not seen.  There is no pneumothorax or pleural effusion.                              X-Rays:   Independently Interpreted Readings:   Chest X-Ray: Normal heart size. There is an infiltrate in the RUL and LLL.     Medical Decision Making:   Differential Diagnosis:   Acute cerebrovascular accident, transient ischemic attack, hypoglycemia, syncope, near syncope, acute renal failure, acute myocardial infarction, coronavirus, pneumonia, sepsis, severe sepsis, septic shock, acute intracranial hemorrhage, increased intracranial pressure, orthostatic hypotension, seizure disorder, hypoxic brain injury, Covid  ED Management:  This is an emergent evaluation of a critical patient secondary to bilateral COVID-19 pneumonia with subsequent hypoxia and hypotension.  Patient was immediately placed on 4 L nasal cannula with sepsis protocol initiated.  Hypertension improvement aggressive IV fluid hydration, not currently requiring pressors to maintain blood pressure.  Medicated with 30 cc/kg normal saline, 750 mg IV Levaquin, and 10 mg IV Decadron.  Chest x-ray reveals bilateral pulmonary infiltrates consistent COVID-19.  CT head unremarkable no leukocytosis however there is evidence lactic acidosis, renal impairment, and transaminitis.                             Clinical Impression:       ICD-10-CM ICD-9-CM   1. Pneumonia of left lower lobe due to infectious organism  J18.9 486   2. Syncope  R55 780.2   3. Pneumonia of right upper lobe due to infectious organism  J18.9 486   4. Pneumonia  due to COVID-19 virus  U07.1 480.8    J12.89    5. Hypoxia  R09.02 799.02   6. Dependence on continuous supplemental oxygen  Z99.81 V46.2   7. Hypotension due to hypovolemia  I95.89 458.8    E86.1 276.52   8. Interstitial pulmonary fibrosis  J84.10 515   9. Acute renal injury due to hypovolemia  N17.9 584.9    E86.1 276.52   10. Transaminitis  R74.01 790.4   11. Lactic acidosis  E87.2 276.2                      Disposition:   Disposition: Transferred  Condition: Serious                          Tessa Hughes MD  10/28/20 0031

## 2020-10-25 LAB
25(OH)D3+25(OH)D2 SERPL-MCNC: 34 NG/ML (ref 30–96)
ALBUMIN SERPL BCP-MCNC: 3 G/DL (ref 3.5–5.2)
ALP SERPL-CCNC: 62 U/L (ref 55–135)
ALT SERPL W/O P-5'-P-CCNC: 81 U/L (ref 10–44)
ANION GAP SERPL CALC-SCNC: 11 MMOL/L (ref 8–16)
AST SERPL-CCNC: 89 U/L (ref 10–40)
BASOPHILS # BLD AUTO: 0.01 K/UL (ref 0–0.2)
BASOPHILS NFR BLD: 0.3 % (ref 0–1.9)
BILIRUB SERPL-MCNC: 0.7 MG/DL (ref 0.1–1)
BUN SERPL-MCNC: 23 MG/DL (ref 8–23)
CALCIUM SERPL-MCNC: 8.8 MG/DL (ref 8.7–10.5)
CHLORIDE SERPL-SCNC: 107 MMOL/L (ref 95–110)
CK SERPL-CCNC: 132 U/L (ref 20–200)
CO2 SERPL-SCNC: 21 MMOL/L (ref 23–29)
CREAT SERPL-MCNC: 1.1 MG/DL (ref 0.5–1.4)
CRP SERPL-MCNC: 91.9 MG/L (ref 0–8.2)
D DIMER PPP IA.FEU-MCNC: 17.12 MG/L FEU
DIFFERENTIAL METHOD: ABNORMAL
EOSINOPHIL # BLD AUTO: 0 K/UL (ref 0–0.5)
EOSINOPHIL NFR BLD: 0 % (ref 0–8)
ERYTHROCYTE [DISTWIDTH] IN BLOOD BY AUTOMATED COUNT: 12.6 % (ref 11.5–14.5)
ERYTHROCYTE [SEDIMENTATION RATE] IN BLOOD BY WESTERGREN METHOD: 42 MM/HR (ref 0–23)
EST. GFR  (AFRICAN AMERICAN): >60 ML/MIN/1.73 M^2
EST. GFR  (NON AFRICAN AMERICAN): >60 ML/MIN/1.73 M^2
FERRITIN SERPL-MCNC: 556 NG/ML (ref 20–300)
FOLATE SERPL-MCNC: 6.9 NG/ML (ref 4–24)
GLUCOSE SERPL-MCNC: 146 MG/DL (ref 70–110)
HCT VFR BLD AUTO: 33.3 % (ref 40–54)
HGB BLD-MCNC: 11.3 G/DL (ref 14–18)
IMM GRANULOCYTES # BLD AUTO: 0.05 K/UL (ref 0–0.04)
IMM GRANULOCYTES NFR BLD AUTO: 1.3 % (ref 0–0.5)
LDH SERPL L TO P-CCNC: 454 U/L (ref 110–260)
LYMPHOCYTES # BLD AUTO: 0.3 K/UL (ref 1–4.8)
LYMPHOCYTES NFR BLD: 8.1 % (ref 18–48)
MAGNESIUM SERPL-MCNC: 2.2 MG/DL (ref 1.6–2.6)
MCH RBC QN AUTO: 33.6 PG (ref 27–31)
MCHC RBC AUTO-ENTMCNC: 33.9 G/DL (ref 32–36)
MCV RBC AUTO: 99 FL (ref 82–98)
MONOCYTES # BLD AUTO: 0.2 K/UL (ref 0.3–1)
MONOCYTES NFR BLD: 5.3 % (ref 4–15)
NEUTROPHILS # BLD AUTO: 3.3 K/UL (ref 1.8–7.7)
NEUTROPHILS NFR BLD: 85 % (ref 38–73)
NRBC BLD-RTO: 0 /100 WBC
PHOSPHATE SERPL-MCNC: 3.4 MG/DL (ref 2.7–4.5)
PLATELET # BLD AUTO: 146 K/UL (ref 150–350)
PLATELET BLD QL SMEAR: ABNORMAL
PMV BLD AUTO: 10.6 FL (ref 9.2–12.9)
POTASSIUM SERPL-SCNC: 4.1 MMOL/L (ref 3.5–5.1)
PROCALCITONIN SERPL IA-MCNC: 0.07 NG/ML
PROT SERPL-MCNC: 6.4 G/DL (ref 6–8.4)
RBC # BLD AUTO: 3.36 M/UL (ref 4.6–6.2)
SODIUM SERPL-SCNC: 139 MMOL/L (ref 136–145)
WBC # BLD AUTO: 3.93 K/UL (ref 3.9–12.7)

## 2020-10-25 PROCEDURE — 97161 PT EVAL LOW COMPLEX 20 MIN: CPT

## 2020-10-25 PROCEDURE — 94761 N-INVAS EAR/PLS OXIMETRY MLT: CPT

## 2020-10-25 PROCEDURE — 84100 ASSAY OF PHOSPHORUS: CPT

## 2020-10-25 PROCEDURE — 80053 COMPREHEN METABOLIC PANEL: CPT

## 2020-10-25 PROCEDURE — 82550 ASSAY OF CK (CPK): CPT

## 2020-10-25 PROCEDURE — 63600175 PHARM REV CODE 636 W HCPCS: Performed by: HOSPITALIST

## 2020-10-25 PROCEDURE — 99233 PR SUBSEQUENT HOSPITAL CARE,LEVL III: ICD-10-PCS | Mod: ,,, | Performed by: STUDENT IN AN ORGANIZED HEALTH CARE EDUCATION/TRAINING PROGRAM

## 2020-10-25 PROCEDURE — 25000242 PHARM REV CODE 250 ALT 637 W/ HCPCS: Performed by: STUDENT IN AN ORGANIZED HEALTH CARE EDUCATION/TRAINING PROGRAM

## 2020-10-25 PROCEDURE — 63600175 PHARM REV CODE 636 W HCPCS: Performed by: STUDENT IN AN ORGANIZED HEALTH CARE EDUCATION/TRAINING PROGRAM

## 2020-10-25 PROCEDURE — 99900035 HC TECH TIME PER 15 MIN (STAT)

## 2020-10-25 PROCEDURE — 82306 VITAMIN D 25 HYDROXY: CPT

## 2020-10-25 PROCEDURE — 83615 LACTATE (LD) (LDH) ENZYME: CPT

## 2020-10-25 PROCEDURE — 99233 SBSQ HOSP IP/OBS HIGH 50: CPT | Mod: ,,, | Performed by: STUDENT IN AN ORGANIZED HEALTH CARE EDUCATION/TRAINING PROGRAM

## 2020-10-25 PROCEDURE — 83735 ASSAY OF MAGNESIUM: CPT

## 2020-10-25 PROCEDURE — 94640 AIRWAY INHALATION TREATMENT: CPT

## 2020-10-25 PROCEDURE — 36415 COLL VENOUS BLD VENIPUNCTURE: CPT

## 2020-10-25 PROCEDURE — 82728 ASSAY OF FERRITIN: CPT

## 2020-10-25 PROCEDURE — 85025 COMPLETE CBC W/AUTO DIFF WBC: CPT

## 2020-10-25 PROCEDURE — 97530 THERAPEUTIC ACTIVITIES: CPT

## 2020-10-25 PROCEDURE — 86140 C-REACTIVE PROTEIN: CPT

## 2020-10-25 PROCEDURE — 82746 ASSAY OF FOLIC ACID SERUM: CPT

## 2020-10-25 PROCEDURE — 20600001 HC STEP DOWN PRIVATE ROOM

## 2020-10-25 PROCEDURE — 97165 OT EVAL LOW COMPLEX 30 MIN: CPT

## 2020-10-25 PROCEDURE — 85379 FIBRIN DEGRADATION QUANT: CPT

## 2020-10-25 PROCEDURE — 97116 GAIT TRAINING THERAPY: CPT

## 2020-10-25 PROCEDURE — 84145 PROCALCITONIN (PCT): CPT

## 2020-10-25 PROCEDURE — 25000003 PHARM REV CODE 250: Performed by: STUDENT IN AN ORGANIZED HEALTH CARE EDUCATION/TRAINING PROGRAM

## 2020-10-25 PROCEDURE — 87899 AGENT NOS ASSAY W/OPTIC: CPT

## 2020-10-25 PROCEDURE — 85652 RBC SED RATE AUTOMATED: CPT

## 2020-10-25 RX ORDER — ATORVASTATIN CALCIUM 20 MG/1
40 TABLET, FILM COATED ORAL NIGHTLY
Status: DISCONTINUED | OUTPATIENT
Start: 2020-10-25 | End: 2020-11-02 | Stop reason: HOSPADM

## 2020-10-25 RX ORDER — HALOPERIDOL 5 MG/ML
1 INJECTION INTRAMUSCULAR EVERY 6 HOURS PRN
Status: DISCONTINUED | OUTPATIENT
Start: 2020-10-25 | End: 2020-10-25

## 2020-10-25 RX ORDER — FOLIC ACID 1 MG/1
1 TABLET ORAL DAILY
Status: DISCONTINUED | OUTPATIENT
Start: 2020-10-25 | End: 2020-11-02 | Stop reason: HOSPADM

## 2020-10-25 RX ORDER — ENOXAPARIN SODIUM 150 MG/ML
1 INJECTION SUBCUTANEOUS
Status: DISCONTINUED | OUTPATIENT
Start: 2020-10-25 | End: 2020-10-26

## 2020-10-25 RX ORDER — ATORVASTATIN CALCIUM 20 MG/1
40 TABLET, FILM COATED ORAL DAILY
Status: DISCONTINUED | OUTPATIENT
Start: 2020-10-26 | End: 2020-10-25

## 2020-10-25 RX ORDER — LEVOFLOXACIN 750 MG/1
750 TABLET ORAL DAILY
Status: DISCONTINUED | OUTPATIENT
Start: 2020-10-25 | End: 2020-10-26

## 2020-10-25 RX ORDER — HALOPERIDOL 5 MG/ML
5 INJECTION INTRAMUSCULAR EVERY 6 HOURS PRN
Status: DISCONTINUED | OUTPATIENT
Start: 2020-10-25 | End: 2020-10-26

## 2020-10-25 RX ORDER — HALOPERIDOL 5 MG/ML
5 INJECTION INTRAMUSCULAR EVERY 6 HOURS PRN
Status: DISCONTINUED | OUTPATIENT
Start: 2020-10-25 | End: 2020-10-25

## 2020-10-25 RX ADMIN — VENLAFAXINE 37.5 MG: 37.5 TABLET ORAL at 09:10

## 2020-10-25 RX ADMIN — OXYCODONE HYDROCHLORIDE AND ACETAMINOPHEN 500 MG: 500 TABLET ORAL at 09:10

## 2020-10-25 RX ADMIN — AMLODIPINE BESYLATE 5 MG: 5 TABLET ORAL at 08:10

## 2020-10-25 RX ADMIN — CYANOCOBALAMIN TAB 250 MCG 250 MCG: 250 TAB at 08:10

## 2020-10-25 RX ADMIN — HEPARIN SODIUM 5000 UNITS: 5000 INJECTION INTRAVENOUS; SUBCUTANEOUS at 08:10

## 2020-10-25 RX ADMIN — ENOXAPARIN SODIUM 75 MG: 150 INJECTION SUBCUTANEOUS at 10:10

## 2020-10-25 RX ADMIN — OXYCODONE HYDROCHLORIDE AND ACETAMINOPHEN 500 MG: 500 TABLET ORAL at 08:10

## 2020-10-25 RX ADMIN — IPRATROPIUM BROMIDE 2 PUFF: 17 AEROSOL, METERED RESPIRATORY (INHALATION) at 03:10

## 2020-10-25 RX ADMIN — ALBUTEROL SULFATE 2 PUFF: 90 AEROSOL, METERED RESPIRATORY (INHALATION) at 01:10

## 2020-10-25 RX ADMIN — ATORVASTATIN CALCIUM 20 MG: 20 TABLET, FILM COATED ORAL at 08:10

## 2020-10-25 RX ADMIN — IPRATROPIUM BROMIDE 2 PUFF: 17 AEROSOL, METERED RESPIRATORY (INHALATION) at 07:10

## 2020-10-25 RX ADMIN — DEXAMETHASONE 6 MG: 4 TABLET ORAL at 08:10

## 2020-10-25 RX ADMIN — LEVOFLOXACIN 750 MG: 750 TABLET, FILM COATED ORAL at 08:10

## 2020-10-25 RX ADMIN — IPRATROPIUM BROMIDE 2 PUFF: 17 AEROSOL, METERED RESPIRATORY (INHALATION) at 01:10

## 2020-10-25 RX ADMIN — LACTOBACILLUS ACIDOPHILUS / LACTOBACILLUS BULGARICUS 1 EACH: 100 MILLION CFU STRENGTH GRANULES at 08:10

## 2020-10-25 RX ADMIN — VENLAFAXINE 37.5 MG: 37.5 TABLET ORAL at 08:10

## 2020-10-25 RX ADMIN — REMDESIVIR 200 MG: 100 INJECTION, POWDER, LYOPHILIZED, FOR SOLUTION INTRAVENOUS at 04:10

## 2020-10-25 RX ADMIN — ALBUTEROL SULFATE 2 PUFF: 90 AEROSOL, METERED RESPIRATORY (INHALATION) at 07:10

## 2020-10-25 RX ADMIN — HALOPERIDOL LACTATE 5 MG: 5 INJECTION, SOLUTION INTRAMUSCULAR at 09:10

## 2020-10-25 RX ADMIN — FOLIC ACID 1 MG: 1 TABLET ORAL at 10:10

## 2020-10-25 RX ADMIN — ALBUTEROL SULFATE 2 PUFF: 90 AEROSOL, METERED RESPIRATORY (INHALATION) at 03:10

## 2020-10-25 RX ADMIN — HALOPERIDOL LACTATE 1 MG: 5 INJECTION, SOLUTION INTRAMUSCULAR at 10:10

## 2020-10-25 RX ADMIN — LACTOBACILLUS ACIDOPHILUS / LACTOBACILLUS BULGARICUS 1 EACH: 100 MILLION CFU STRENGTH GRANULES at 09:10

## 2020-10-25 RX ADMIN — ALBUTEROL SULFATE 2 PUFF: 90 AEROSOL, METERED RESPIRATORY (INHALATION) at 09:10

## 2020-10-25 RX ADMIN — Medication 1000 UNITS: at 08:10

## 2020-10-25 RX ADMIN — ATORVASTATIN CALCIUM 40 MG: 20 TABLET, FILM COATED ORAL at 09:10

## 2020-10-25 RX ADMIN — IPRATROPIUM BROMIDE 2 PUFF: 17 AEROSOL, METERED RESPIRATORY (INHALATION) at 09:10

## 2020-10-25 NOTE — PT/OT/SLP EVAL
Physical Therapy  Co-evaluation and co-treatment with OT    Patient Name:  Naty Hope   MRN:  [id number]    Recommendations:     Discharge Recommendations:  home health PT   Discharge Equipment Recommendations: none   Barriers to discharge: Decreased caregiver support pt was slightly confused and may need 24 hour supervision for safety.     Assessment:     Naty Hope is a 85 y.o. male admitted with a medical diagnosis of Pneumonia due to COVID-19 virus.  He presents with the following impairments/functional limitations:  gait instability, impaired balance, impaired endurance pt tolerated treatment well but decreased oxygen saturations decreased functional mobility. Pt will benefit from skilled PT 3x/wk to progress physically. Pt should be able to discharge home with HHPT and may need 24 hour supervision for safety. Pt was [facility] .    Rehab Prognosis: Good; patient would benefit from acute skilled PT services to address these deficits and reach maximum level of function.    Recent Surgery: * No surgery found *      Plan:     During this hospitalization, patient to be seen 3 x/week to address the identified rehab impairments via gait training, therapeutic exercises and progress toward the following goals:    · Plan of Care Expires:  11/23/20    Subjective     Chief Complaint: pt stated that he was going home today.   Patient/Family Comments/goals: to get better and go home.   Pain/Comfort:  · Pain Rating 1: 0/10  · Pain Rating Post-Intervention 1: 0/10    Patients cultural, spiritual, Druze conflicts given the current situation: no    Living Environment:  Pt is retired and lives alone in 3 story with B&B downstairs and no steps to entrance.   Prior to admission, patients level of function was Independent .  Equipment used at home: none.  DME owned (not currently used): none.  Upon discharge, patient will have assistance from brother? .    Objective:  "    Communicated with nurse prior to session.  Patient found standing in room with pulse ox (continuous), telemetry, blood pressure cuff(hep lock IV)  upon PT entry to room.    General Precautions: Standard, airborne, contact, droplet, fall   Orthopedic Precautions:    Braces:       Exams:  · Cognitive Exam:  Patient is oriented to Person, Place and Time. Pt would start telling story during treatment and not be able to finish it. Pt stated that it was "too personal" to ask why he was in the hospital   · RLE ROM: WFL  · RLE Strength: WFL  · LLE ROM: WFL  · LLE Strength: WFL    Functional Mobility:  · Transfers:     · Sit to Stand:  stand by assistance with no AD  ·   · Gait: pt received gait training ~ 224 ft with CGA and mask on. Pt had 1 episode of decreased balance and was CGA to recover.  Pt oxygen saturation was 94% on room air prior to treatment. Pt oxygen saturations was 82% after gait training. Pt was placed on 4L oxygen which was set up in his room. Pt oxygen saturations quickly increased to 95%    PT concentrated on BLE MMT, ROM and gait training during evaluation and treatment.     Therapeutic Activities and Exercises:   pt received verbal instruction in role of PT and POC. Pt verbally expressed understanding of such.     AM-PAC 6 CLICK MOBILITY  Total Score:19     Patient left up in chair with all lines intact, call button in reach and RN notified.    GOALS:   Multidisciplinary Problems     Physical Therapy Goals        Problem: Physical Therapy Goal    Goal Priority Disciplines Outcome Goal Variances Interventions   Physical Therapy Goal     PT, PT/OT Ongoing, Progressing     Description: Goals to be met by: 20    Patient will increase functional independence with mobility by performin. Supine to sit with Modified [place] -not met  2. Sit to stand transfer with Modified [place] -not met  3. Gait  x 250 feet with Supervision maintaining oxygen saturations -not met  4. Pt perform AROM " therapeutic exercises x 15 reps BLE in sitting in increased tolerance to activities and increase functional mobility- not met                     History:     Past Medical History:   Diagnosis Date    Chronic fatigue     Hypertension        Past Surgical History:   Procedure Laterality Date    CHOLECYSTECTOMY      HERNIA REPAIR         Time Tracking:     PT Received On: 10/25/20  PT Start Time: 0940     PT Stop Time: 0956  PT Total Time (min): 16 min     Billable Minutes: Evaluation 8 min and Gait Training 8 min      Nika Ross, PT  10/25/2020

## 2020-10-25 NOTE — PLAN OF CARE
Problem: Physical Therapy Goal  Goal: Physical Therapy Goal  Description: Goals to be met by: 20    Patient will increase functional independence with mobility by performin. Supine to sit with Modified [place] -not met  2. Sit to stand transfer with Modified [place] -not met  3. Gait  x 250 feet with Supervision maintaining oxygen saturations -not met  4. Pt perform AROM therapeutic exercises x 15 reps BLE in sitting in increased tolerance to activities and increase functional mobility- not met    Outcome: Ongoing, Progressing   Evaluation completed and goals appropriate. Nika Ross, PT  10/25/2020

## 2020-10-25 NOTE — PLAN OF CARE
Pt resting overnight, no complaints. Plan for Echo and possible start of Remdesivir in AM. Pt remains on 1.5 LPM NC to keep O2 sats up. Pt to wean off O2 as able. WCTM

## 2020-10-25 NOTE — PT/OT/SLP EVAL
"Occupational Therapy   Co-Evaluation    Name: Naty Hope  MRN: [id number]  Admitting Diagnosis:  Pneumonia due to COVID-19 virus      * No surgery found *    Recommendations:     Discharge Recommendations: home health OT with family supervision  Discharge Equipment Recommendations:  none  Barriers to discharge:  None    Assessment:     Naty Hope is a 85 y.o. male with a medical diagnosis of Pneumonia due to COVID-19 virus.  He presents with confusion but is agreeable to participate with therapy and tolerates evaluation well. Performance deficits affecting function: impaired endurance, impaired self care skills, impaired functional mobilty, gait instability, impaired balance, impaired cognition, decreased safety awareness, impaired cardiopulmonary response to activity.      Rehab Prognosis: Good; patient would benefit from acute skilled OT services to address these deficits and reach maximum level of function.       Plan:     Patient to be seen 3 x/week to address the above listed problems via self-care/home management, therapeutic activities, therapeutic exercises  · Plan of Care Expires: 11/25/20  · Plan of Care Reviewed with: patient    Subjective     Chief Complaint: tired, "I haven't slept in 3 days."  Patient/Family Comments/goals: "Ladies, I just don't have time. I'm about to leave."    Occupational Profile:  Living Environment: Patient lives alone in a 3SH with with 0 steps to enter. Patient's bed/bath are located on the 1st floor.  Previous level of function: Independent  Roles and Routines: Father, grandfather, brother. Patient drives and is retired. He enjoys reading.  Equipment Used at Home:  none  Assistance upon Discharge: Limited from family    Pain/Comfort:  · Pain Rating 1: 0/10    Patients cultural, spiritual, Anglican conflicts given the current situation: no    Objective:     Communicated with: RN prior to session.  Patient found standing in the room with RN with pulse ox " "(continuous), telemetry, blood pressure cuff(hep lock IV) upon OT entry to room.    General Precautions: Standard, airborne, contact, droplet, fall   Orthopedic Precautions:N/A   Braces: N/A     Occupational Performance:    Bed Mobility:    · Did not observe    Functional Mobility/Transfers:  · Patient completed Sit <> Stand Transfer with stand by assistance with no assistive device   · Functional Mobility: ~224' with contact guard assistance and with no AD    Activities of Daily Living:  · Grooming: Patient participated in face wash with a washcloth while sitting up in the chair with Set-up Assistance.  · Upper Body Dressing: stand by assistance Patient donned hospital gown as a robe while standing with SBA.    Cognitive/Visual Perceptual:  Cognitive/Psychosocial Skills:     -       Oriented to: Person, Place, Time and for reason he's in the hospital, patient stated "That's getting personal."   -       Follows Commands/attention:Follows multistep  commands    Physical Exam:  Upper Extremity Range of Motion:     -       Right Upper Extremity: WNL  -       Left Upper Extremity: WNL  Upper Extremity Strength:    -       Right Upper Extremity: WFL  -       Left Upper Extremity: WFL   Strength:    -       Right Upper Extremity: WFL  -       Left Upper Extremity: WFL  Fine Motor Coordination:    -       Intact  Left hand thumb/finger opposition skills and Right hand thumb/finger opposition skills    AMPAC 6 Click ADL:  AMPAC Total Score: 20    Treatment & Education:  Role of OT/evaluation   Initially patient SpO2 at 94% on room air; decreased to 82% following functional mobility; patient placed on 4L O2 (set up in room already) and SpO2 increased to 95%  Call button for assistance    Patient left up in chair with all lines intact, call button in reach and RN notified    GOALS:   Multidisciplinary Problems     Occupational Therapy Goals        Problem: Occupational Therapy Goal    Goal Priority Disciplines Outcome " Interventions   Occupational Therapy Goal     OT, PT/OT Ongoing, Progressing    Description: Goals to be met by: 11/8/20     Patient will increase functional independence with ADLs by performing:    UE Dressing with Supervision.  LE Dressing with Supervision.  Grooming while standing with Supervision.  Toileting from toilet with Supervision for hygiene and clothing management.   Supine to sit with [place] in preparation for EOB/OOB functional activities.  Toilet transfer to toilet with Supervision.                     History:     Past Medical History:   Diagnosis Date    Chronic fatigue     Hypertension        Past Surgical History:   Procedure Laterality Date    CHOLECYSTECTOMY      HERNIA REPAIR         Time Tracking:     OT Date of Treatment: 10/25/20  OT Start Time: 0940  OT Stop Time: 0957  OT Total Time (min): 17 min    Billable Minutes:Evaluation 9 minutes  Therapeutic Activity 8 minutes    Fabiola Roy OT  10/25/2020

## 2020-10-25 NOTE — PLAN OF CARE
Problem: Occupational Therapy Goal  Goal: Occupational Therapy Goal  Description: Goals to be met by: 11/8/20     Patient will increase functional independence with ADLs by performing:    UE Dressing with Supervision.  LE Dressing with Supervision.  Grooming while standing with Supervision.  Toileting from toilet with Supervision for hygiene and clothing management.   Supine to sit with [place] in preparation for EOB/OOB functional activities.  Toilet transfer to toilet with Supervision.    Outcome: Ongoing, Progressing    OT evaluation completed and POC established.  Fabiola Roy OT  10/25/2020

## 2020-10-25 NOTE — NURSING
1700: pt removed iv. Electrodes and pulse ox, multiple times. Refused to stay in bed. Needs a bedside sitter. Only telesitter available. Refuses to have a new IV inserted. remdesivir did not finish infusing.

## 2020-10-25 NOTE — PROGRESS NOTES
Pharmacist Renal Dose Adjustment Note    Naty Hope is a 85 y.o. male being treated with the medication levofloxacin 750 mg every 48 hours.    Patient Data:    Vital Signs (Most Recent):  Temp: 97.6 °F (36.4 °C) (10/25/20 0437)  Pulse: 84 (10/25/20 0747)  Resp: 20 (10/25/20 0747)  BP: (!) 140/67 (10/25/20 0437)  SpO2: (!) 92 % (10/25/20 0747)   Vital Signs (72h Range):  Temp:  [97.6 °F (36.4 °C)-99.4 °F (37.4 °C)]   Pulse:  [64-94]   Resp:  [16-23]   BP: ()/(58-80)   SpO2:  [82 %-98 %]      Recent Labs   Lab 10/24/20  1025 10/25/20  0253   CREATININE 1.6* 1.1     Serum creatinine: 1.1 mg/dL 10/25/20 0253  Estimated creatinine clearance: 54.2 mL/min    Medication: levofloxacin 750 mg every 48 hours will be changed to levofloxacin 750 mg every 24 hours.    Pharmacist's Name: Kylie Andrew  Pharmacist's Extension: [phone]

## 2020-10-25 NOTE — NURSING
Pt resting in bed, no complaints at this time. Pt medicated per MAR. Pt educated on different monitoring items in room. DANAY

## 2020-10-26 LAB
ALBUMIN SERPL BCP-MCNC: 3.3 G/DL (ref 3.5–5.2)
ALP SERPL-CCNC: 75 U/L (ref 55–135)
ALT SERPL W/O P-5'-P-CCNC: 101 U/L (ref 10–44)
ANION GAP SERPL CALC-SCNC: 10 MMOL/L (ref 8–16)
AST SERPL-CCNC: 107 U/L (ref 10–40)
BASOPHILS # BLD AUTO: 0.02 K/UL (ref 0–0.2)
BASOPHILS NFR BLD: 0.2 % (ref 0–1.9)
BILIRUB SERPL-MCNC: 0.6 MG/DL (ref 0.1–1)
BUN SERPL-MCNC: 33 MG/DL (ref 8–23)
CALCIUM SERPL-MCNC: 9.2 MG/DL (ref 8.7–10.5)
CHLORIDE SERPL-SCNC: 109 MMOL/L (ref 95–110)
CO2 SERPL-SCNC: 20 MMOL/L (ref 23–29)
CREAT SERPL-MCNC: 1.2 MG/DL (ref 0.5–1.4)
DIFFERENTIAL METHOD: ABNORMAL
EOSINOPHIL # BLD AUTO: 0 K/UL (ref 0–0.5)
EOSINOPHIL NFR BLD: 0 % (ref 0–8)
ERYTHROCYTE [DISTWIDTH] IN BLOOD BY AUTOMATED COUNT: 12.8 % (ref 11.5–14.5)
EST. GFR  (AFRICAN AMERICAN): >60 ML/MIN/1.73 M^2
EST. GFR  (NON AFRICAN AMERICAN): 55.6 ML/MIN/1.73 M^2
GLUCOSE SERPL-MCNC: 167 MG/DL (ref 70–110)
HCT VFR BLD AUTO: 36.1 % (ref 40–54)
HGB BLD-MCNC: 11.8 G/DL (ref 14–18)
IMM GRANULOCYTES # BLD AUTO: 0.09 K/UL (ref 0–0.04)
IMM GRANULOCYTES NFR BLD AUTO: 0.9 % (ref 0–0.5)
LYMPHOCYTES # BLD AUTO: 0.5 K/UL (ref 1–4.8)
LYMPHOCYTES NFR BLD: 5 % (ref 18–48)
MAGNESIUM SERPL-MCNC: 2.2 MG/DL (ref 1.6–2.6)
MCH RBC QN AUTO: 33.5 PG (ref 27–31)
MCHC RBC AUTO-ENTMCNC: 32.7 G/DL (ref 32–36)
MCV RBC AUTO: 103 FL (ref 82–98)
MONOCYTES # BLD AUTO: 0.5 K/UL (ref 0.3–1)
MONOCYTES NFR BLD: 4.9 % (ref 4–15)
NEUTROPHILS # BLD AUTO: 8.8 K/UL (ref 1.8–7.7)
NEUTROPHILS NFR BLD: 89 % (ref 38–73)
NRBC BLD-RTO: 0 /100 WBC
PHOSPHATE SERPL-MCNC: 3.2 MG/DL (ref 2.7–4.5)
PLATELET # BLD AUTO: 175 K/UL (ref 150–350)
PMV BLD AUTO: 10.6 FL (ref 9.2–12.9)
POTASSIUM SERPL-SCNC: 4 MMOL/L (ref 3.5–5.1)
PROT SERPL-MCNC: 6.6 G/DL (ref 6–8.4)
RBC # BLD AUTO: 3.52 M/UL (ref 4.6–6.2)
SODIUM SERPL-SCNC: 139 MMOL/L (ref 136–145)
WBC # BLD AUTO: 9.93 K/UL (ref 3.9–12.7)

## 2020-10-26 PROCEDURE — 25000003 PHARM REV CODE 250: Performed by: STUDENT IN AN ORGANIZED HEALTH CARE EDUCATION/TRAINING PROGRAM

## 2020-10-26 PROCEDURE — 84100 ASSAY OF PHOSPHORUS: CPT

## 2020-10-26 PROCEDURE — 63600175 PHARM REV CODE 636 W HCPCS: Performed by: STUDENT IN AN ORGANIZED HEALTH CARE EDUCATION/TRAINING PROGRAM

## 2020-10-26 PROCEDURE — 27000221 HC OXYGEN, UP TO 24 HOURS

## 2020-10-26 PROCEDURE — 99233 PR SUBSEQUENT HOSPITAL CARE,LEVL III: ICD-10-PCS | Mod: ,,, | Performed by: STUDENT IN AN ORGANIZED HEALTH CARE EDUCATION/TRAINING PROGRAM

## 2020-10-26 PROCEDURE — 85025 COMPLETE CBC W/AUTO DIFF WBC: CPT

## 2020-10-26 PROCEDURE — 82728 ASSAY OF FERRITIN: CPT

## 2020-10-26 PROCEDURE — 83735 ASSAY OF MAGNESIUM: CPT

## 2020-10-26 PROCEDURE — 94761 N-INVAS EAR/PLS OXIMETRY MLT: CPT

## 2020-10-26 PROCEDURE — 86140 C-REACTIVE PROTEIN: CPT

## 2020-10-26 PROCEDURE — 20600001 HC STEP DOWN PRIVATE ROOM

## 2020-10-26 PROCEDURE — 36415 COLL VENOUS BLD VENIPUNCTURE: CPT

## 2020-10-26 PROCEDURE — 94640 AIRWAY INHALATION TREATMENT: CPT

## 2020-10-26 PROCEDURE — 63600175 PHARM REV CODE 636 W HCPCS: Performed by: HOSPITALIST

## 2020-10-26 PROCEDURE — 83615 LACTATE (LD) (LDH) ENZYME: CPT

## 2020-10-26 PROCEDURE — 85379 FIBRIN DEGRADATION QUANT: CPT

## 2020-10-26 PROCEDURE — 80053 COMPREHEN METABOLIC PANEL: CPT

## 2020-10-26 PROCEDURE — 99233 SBSQ HOSP IP/OBS HIGH 50: CPT | Mod: ,,, | Performed by: STUDENT IN AN ORGANIZED HEALTH CARE EDUCATION/TRAINING PROGRAM

## 2020-10-26 PROCEDURE — 99900035 HC TECH TIME PER 15 MIN (STAT)

## 2020-10-26 RX ORDER — LORAZEPAM 2 MG/ML
0.5 INJECTION INTRAMUSCULAR ONCE AS NEEDED
Status: COMPLETED | OUTPATIENT
Start: 2020-10-26 | End: 2020-10-26

## 2020-10-26 RX ORDER — HYDRALAZINE HYDROCHLORIDE 50 MG/1
50 TABLET, FILM COATED ORAL EVERY 8 HOURS PRN
Status: DISCONTINUED | OUTPATIENT
Start: 2020-10-26 | End: 2020-10-26

## 2020-10-26 RX ORDER — ENOXAPARIN SODIUM 100 MG/ML
1 INJECTION SUBCUTANEOUS
Status: DISCONTINUED | OUTPATIENT
Start: 2020-10-26 | End: 2020-11-02 | Stop reason: HOSPADM

## 2020-10-26 RX ORDER — QUETIAPINE FUMARATE 25 MG/1
25 TABLET, FILM COATED ORAL NIGHTLY
Status: DISCONTINUED | OUTPATIENT
Start: 2020-10-26 | End: 2020-10-30

## 2020-10-26 RX ORDER — HALOPERIDOL 5 MG/ML
2 INJECTION INTRAMUSCULAR EVERY 6 HOURS PRN
Status: DISCONTINUED | OUTPATIENT
Start: 2020-10-26 | End: 2020-11-02 | Stop reason: HOSPADM

## 2020-10-26 RX ADMIN — ATORVASTATIN CALCIUM 40 MG: 20 TABLET, FILM COATED ORAL at 09:10

## 2020-10-26 RX ADMIN — VENLAFAXINE 37.5 MG: 37.5 TABLET ORAL at 09:10

## 2020-10-26 RX ADMIN — ALBUTEROL SULFATE 2 PUFF: 90 AEROSOL, METERED RESPIRATORY (INHALATION) at 01:10

## 2020-10-26 RX ADMIN — CYANOCOBALAMIN TAB 250 MCG 250 MCG: 250 TAB at 09:10

## 2020-10-26 RX ADMIN — REMDESIVIR 100 MG: 100 INJECTION, POWDER, LYOPHILIZED, FOR SOLUTION INTRAVENOUS at 04:10

## 2020-10-26 RX ADMIN — ALBUTEROL SULFATE 2 PUFF: 90 AEROSOL, METERED RESPIRATORY (INHALATION) at 09:10

## 2020-10-26 RX ADMIN — DEXAMETHASONE 6 MG: 4 TABLET ORAL at 09:10

## 2020-10-26 RX ADMIN — ACETAMINOPHEN 650 MG: 325 TABLET ORAL at 11:10

## 2020-10-26 RX ADMIN — IPRATROPIUM BROMIDE 2 PUFF: 17 AEROSOL, METERED RESPIRATORY (INHALATION) at 09:10

## 2020-10-26 RX ADMIN — LACTOBACILLUS ACIDOPHILUS / LACTOBACILLUS BULGARICUS 1 EACH: 100 MILLION CFU STRENGTH GRANULES at 09:10

## 2020-10-26 RX ADMIN — QUETIAPINE FUMARATE 25 MG: 25 TABLET ORAL at 09:10

## 2020-10-26 RX ADMIN — ENOXAPARIN SODIUM 75 MG: 150 INJECTION SUBCUTANEOUS at 11:10

## 2020-10-26 RX ADMIN — ALBUTEROL SULFATE 2 PUFF: 90 AEROSOL, METERED RESPIRATORY (INHALATION) at 07:10

## 2020-10-26 RX ADMIN — IPRATROPIUM BROMIDE 2 PUFF: 17 AEROSOL, METERED RESPIRATORY (INHALATION) at 07:10

## 2020-10-26 RX ADMIN — OXYCODONE HYDROCHLORIDE AND ACETAMINOPHEN 500 MG: 500 TABLET ORAL at 09:10

## 2020-10-26 RX ADMIN — IPRATROPIUM BROMIDE 2 PUFF: 17 AEROSOL, METERED RESPIRATORY (INHALATION) at 01:10

## 2020-10-26 RX ADMIN — AMLODIPINE BESYLATE 5 MG: 5 TABLET ORAL at 09:10

## 2020-10-26 RX ADMIN — LORAZEPAM 0.5 MG: 2 INJECTION INTRAMUSCULAR; INTRAVENOUS at 01:10

## 2020-10-26 RX ADMIN — LEVOFLOXACIN 750 MG: 750 TABLET, FILM COATED ORAL at 09:10

## 2020-10-26 RX ADMIN — FOLIC ACID 1 MG: 1 TABLET ORAL at 09:10

## 2020-10-26 NOTE — PLAN OF CARE
10/26/20 1000   Post-Acute Status   Post-Acute Authorization Other   Other Status See Comments     NAY attempted to contact patient son Wilfredo to discuss the dc plan in regard to home health but could not make contact. NAY left a voicemail and will call back .      Shania León LMSW  [facility]   [phone]

## 2020-10-26 NOTE — NURSING
Pt confused to location and what is going on with himself.  Pt adamant about leaving AMA but is confused and unable to make decision about leaving. MD called, orders for 5 mg Haldol IM to be given, pt pulled IV out this AM and is refusing new insert of IV. Pt currently agreeable to go to bed. WCTM

## 2020-10-26 NOTE — NURSING
Pt has increased confusion through night, multiple attempts to get out of be after given 5 mg haldol. Orders received for lorazepam, and restraints as reorienting has been unsuccessful.

## 2020-10-26 NOTE — PLAN OF CARE
CM spoke with son  Trixie Jensen via phone to complete Discharge Planning Assessment.  Per Trixie,  patient lives with alone in a( single family home  with no steps  to enter.   Per Trixie, patient was independent with ADLS and used NO DME for ambulation / DOES NOT have in-home assistive equipment. He is not on dialysis or Coumadin . Medications are taken as prescribed / kept refilled / has resources available to meet all daily and prescriptive needs. Preferred pharmacy is [facility] in [place]  [place]. The patient will have assistance from sons and family friend/neighbors upon discharge.  All questions addressed. Unit and CM direct contact  numbers provided. Will follow for course of hospitalization.    10/24/2020  3:39 PM   Pneumonia due to COVID-19 virus [U07.1, J12.89]      PCP: Primary Doctor No    Pharmacy:  [facility]/pharmacy #[id number] - [place], [place] - [address]  [address] [place] [address]  Phone: [phone] Fax: [phone]    Emergency Contacts:  TRIXIE JENSEN (son) [phone]    Insurance:  Payor: MEDICARE / Plan: MEDICARE PART A & B / Product Type: [employer] by [employer] /       Nicolle Machuca RN  Case Management  Ext: [phone]       10/26/20 1242   Discharge Assessment   Assessment Type Discharge Planning Assessment   Confirmed/corrected address and phone number on facesheet? Yes   Assessment information obtained from? Other  (Trixie Jensen (son)  [phone].)   Expected Length of Stay (days) 5   Communicated expected length of stay with patient/caregiver yes   Prior to hospitilization cognitive status: Alert/Oriented;Inappropriate Behavior   Prior to hospitalization functional status: Independent   Current cognitive status: Alert/Oriented;Inappropriate Behavior   Current Functional Status: Independent;Needs Assistance   Facility Arrived From: ED from 10/24/2020 in [facility] - ED   Lives With alone   Able to Return to Prior Arrangements   (TBD)   Is patient able to care for self after  discharge? Unable to determine at this time (comments)   Who are your caregiver(s) and their phone number(s)? Wilfredo Hope (son)  [phone].   Patient's perception of discharge disposition home or selfcare   Readmission Within the Last 30 Days no previous admission in last 30 days   Patient currently being followed by outpatient case management? No   Patient currently receives any other outside agency services? No   Equipment Currently Used at Home none   Do you have any problems affording any of your prescribed medications? No   Is the patient taking medications as prescribed? yes   Does the patient have transportation home? Yes   Transportation Anticipated family or friend will provide   Dialysis Name and Scheduled days N/A   Does the patient receive services at the Coumadin Clinic? No   Discharge Plan A Home with family;Home Health   Discharge Plan B Home with family   DME Needed Upon Discharge  other (see comments)   Patient/Family in Agreement with Plan yes

## 2020-10-26 NOTE — NURSING
Pt pulled off all monitoring devices multiple times while still in restraints. Telemetry notified of this. Secure message sent to MD to notify

## 2020-10-26 NOTE — PROGRESS NOTES
Hospital Medicine   Progress note   Team: [facility] HOSP MED R Nathanael Hamilton MD   Admit Date: 10/24/2020   CHECO 10/28/2020   Code status: Full Code   Principal Problem: Pneumonia due to COVID-19 virus     Hospital Course:  Mr Naty Hope is an 85-year-old m with a PMH chronic fatigue and HTN presented to [facility] via EMS for emergent evaluation of multiple near syncopal episodes and a fall.  Pt reports that he started to have symptoms including myalgias, cough, lightheadedness, fatigue starting 4 days ago. Was seen at an urgent care at that time. Was started on a course of azithromycin. The following day, about 3 days ago, he was called and told that the result of a SARSCOV2 test was positive. On the day of presentation he left his house for the first time in days, experienced near syncope and fell, scraping his right arm against a wall on the way down. A bystander called EMS. Pt reportedly incontinent of urine and stool en route. Pt denies loss of consciousness. Denies recent sick contacts. No fever, chills, nausea, diarrhea. Reports poor appetite over past several days. Has never had a similar event.      In the ED BP 86/69 T 99.1° HR 86 R 18 SpO2 82% (RA) GCS 15.  WBC 7.5 Hb 12.5 Na 134 Cr 1.6 BNP 27 LA 2.8 COVID pos.  CXR pos for infiltrate at the left base and probably at the right upper lung field. Patient given IVF 30 cc/kilogram and O2 4 L nasal catheter with improvement in BP to 120/58 and SpO2 to high 90s.  Patient also given dexamethasone 10 mg IV.  CTH showed no acute changes. Also, BC x 2. Patient started on levofloxacin.      Vital signs upon transfer arrival all normal except pt requiring 3 L NC.     Started remdesivir 10/25. D-dimer elevated to 17 on 10/25. Started therapeutic lovenox.    Interval hx:   Pt continued to be confused overnight. Disoriented when examined today. Stopped levaquin as no sufficient evidence of coinfection and concern for contribution to altered mental  status. Hypoxia stable. Only requiring 2 LPM NC.     ROS   Constitutional: No fever. Weakness.   CV: No chest pain, edema, palpitations  Resp: SOB, cough. No sputum production  GI: No changes in appetite, NVDC, pain, melena, hematochezia, GERD, hematemesis  : No Dysuria, hematuria, urinary urgency, frequency  MSK: No arthralgia/myalgia, joint swelling  Neuro/Psych: No anxiety, depression    PEx   Temp:  [97.6 °F (36.4 °C)-98.1 °F (36.7 °C)]   Pulse:  []   Resp:  [18-47]   BP: (118-150)/(65-84)   SpO2:  [89 %-100 %]      I & O (Last 24H):     Intake/Output Summary (Last 24 hours) at 10/26/2020 1524  Last data filed at 10/26/2020 1200  Gross per 24 hour   Intake 370 ml   Output --   Net 370 ml       General appearance: mild distress  Mental status: Alert. Oriented to self and location but not year or situation.   HEENT:  conjunctivae/corneas clear, PERRL  Neck: supple, thyroid not enlarged  Pulm:   normal respiratory effort. Coarse sounds over lower lung fields. Comfortable on 2l NC.    Card: RRR, S1, S2 normal, no murmur, click, rub or gallop  Abd: soft, NT, ND, BS present; no masses, no organomegaly  Ext: no c/c/e. Missing parts of 2 digits on L hand.   Pulses: 2+, symmetric  Skin: color, texture, turgor normal. Abrasions over R forearm.   Neuro: Cranial Nerves grossly intact, no focal numbness or weakness, normal strength and tone     Recent Results (from the past 24 hour(s))   Comprehensive Metabolic Panel (CMP)    Collection Time: 10/26/20  2:37 AM   Result Value Ref Range    Sodium 139 136 - 145 mmol/L    Potassium 4.0 3.5 - 5.1 mmol/L    Chloride 109 95 - 110 mmol/L    CO2 20 (L) 23 - 29 mmol/L    Glucose 167 (H) 70 - 110 mg/dL    BUN, Bld 33 (H) 8 - 23 mg/dL    Creatinine 1.2 0.5 - 1.4 mg/dL    Calcium 9.2 8.7 - 10.5 mg/dL    Total Protein 6.6 6.0 - 8.4 g/dL    Albumin 3.3 (L) 3.5 - 5.2 g/dL    Total Bilirubin 0.6 0.1 - 1.0 mg/dL    Alkaline Phosphatase 75 55 - 135 U/L     (H) 10 - 40 U/L    ALT  101 (H) 10 - 44 U/L    Anion Gap 10 8 - 16 mmol/L    eGFR if African American >60.0 >60 mL/min/1.73 m^2    eGFR if non African American 55.6 (A) >60 mL/min/1.73 m^2   Magnesium    Collection Time: 10/26/20  2:37 AM   Result Value Ref Range    Magnesium 2.2 1.6 - 2.6 mg/dL   Phosphorus    Collection Time: 10/26/20  2:37 AM   Result Value Ref Range    Phosphorus 3.2 2.7 - 4.5 mg/dL   CBC with Automated Differential    Collection Time: 10/26/20  2:37 AM   Result Value Ref Range    WBC 9.93 3.90 - 12.70 K/uL    RBC 3.52 (L) 4.60 - 6.20 M/uL    Hemoglobin 11.8 (L) 14.0 - 18.0 g/dL    Hematocrit 36.1 (L) 40.0 - 54.0 %    Mean Corpuscular Volume 103 (H) 82 - 98 fL    Mean Corpuscular Hemoglobin 33.5 (H) 27.0 - 31.0 pg    Mean Corpuscular Hemoglobin Conc 32.7 32.0 - 36.0 g/dL    RDW 12.8 11.5 - 14.5 %    Platelets 175 150 - 350 K/uL    MPV 10.6 9.2 - 12.9 fL    Immature Granulocytes 0.9 (H) 0.0 - 0.5 %    Gran # (ANC) 8.8 (H) 1.8 - 7.7 K/uL    Immature Grans (Abs) 0.09 (H) 0.00 - 0.04 K/uL    Lymph # 0.5 (L) 1.0 - 4.8 K/uL    Mono # 0.5 0.3 - 1.0 K/uL    Eos # 0.0 0.0 - 0.5 K/uL    Baso # 0.02 0.00 - 0.20 K/uL    nRBC 0 0 /100 WBC    Gran% 89.0 (H) 38.0 - 73.0 %    Lymph% 5.0 (L) 18.0 - 48.0 %    Mono% 4.9 4.0 - 15.0 %    Eosinophil% 0.0 0.0 - 8.0 %    Basophil% 0.2 0.0 - 1.9 %    Differential Method Automated        No results for input(s): POCTGLUCOSE in the last 168 hours.    No results found for: HGBA1C     Active Hospital Problems    Diagnosis  POA    *Pneumonia due to COVID-19 virus [U07.1, J12.89]  Yes      Resolved Hospital Problems   No resolved problems to display.        Overview:    Assessment and Plan for problems addressed today:   COVID-19 Virus Infection:  Viral Pneumonia due to COVID-19:  -Pt tested for COVID-19 and noted to be positive on 10/24  -Isolation: Airborne/Droplet. Surgical mask on patient. Notify Infection Control  -Management: per [facility] COVID Treatment Protocol (4/15/20)  -Monitoring:  Telemetry & Continuous Pulse Oximetry  -Antibiotics: Continue levofloxacin 750 mg qd  -Nutrition:               -Continue Boost supplement              -Stopped Vitamin D as not deficient              -Ascorbic acid 500mg PO bid  -Supportive Care:              -Acetaminophen 650mg PO Q6hr PRN fever/headache              -Loperamide PRN viral diarrhea              -Robitussin, tessalon perls for cough   -Investigational Therapies:  -Atorvastatin 40mg po daily              -Due to hypoxia, pt started on dexamethasone 6mg PO daily up to 10 days or until pt is discharged from hospital (whichever is sooner)              -Started remdesivir 10/25.      Acute Hypoxemic Respiratory Failure  -RT consult via Respiratory Communication for COVID Protocols  -If wheezing, albuterol INH Q6h scheduled & PRN  -Incentive Spirometer Q4h  -Flutter Valve Q4h  -Continuous pulse oximetry; titrate oxygen to keep sats between 92-96%  -Wean off O2 as tolerated    -Supplemental O2 via LFNC, VentiMask, or HFNC (see Respiratory Support Oxygen Therapies)  -Proning Protocol if patient is a candidate with GCS >13 and able to self-prone (see  Proning Protocol)  -If deterioration, may warrant trial of NIPPV and transfer to neg pressure room or immediate ICU consult     Presyncope with fall  -orthostatics negative  -unlikely heart failure related as BNP completely normal  -Appreciate PT/OT recs- home PT/OT     Macrocytic anemia  -c/w b12 supplementation. Oral in place of home monthly injection  -start folate supplement given borderline low level     HTN  -c/w home amlodipine     Chronic fatigue syndrome  -c/w home venlafaxine      Goals of care, counseling/discussion  -Reviewed the typical clinical course of COVID19 with patient, including the potential for acute decompensation requiring intubation and mechanical ventilation  -Pt currently maintains code status of full code. Pt is going to reflect on this and determine if he wishes to change  status.         VTE High Risk Prophylaxis: therapeutic lovenox    Discharge Planning   CHECO: 10/28/2020     Code Status: Full Code   Is the patient medically ready for discharge?: No    Reason for patient still in hospital (select all that apply): Patient unstable, Patient trending condition, Treatment and PT / OT recommendations  Discharge Plan A: Home with family, Home Health        Nathanael Hamilton MD  [facility] Medicine Staff  [phone] Pager

## 2020-10-26 NOTE — PLAN OF CARE
Pt remains very confused, and agitated. Pt attempting to eat SPO2 sensor, sensor moved to toe. Pt continues to try and get out of bed. Pt reoriented, telesitter in room. Avoiding additional dose of haldol for possible increase in confused state. Pt placed back on O2 as sats were in low 80's. Pt continues to require remdesivir, O2, and restraints at this time. Restraints to keep pt from removing medical devices and Iv's. Pt removed these items earlier in day on previous shift. WCTM

## 2020-10-27 LAB
ALBUMIN SERPL BCP-MCNC: 3.3 G/DL (ref 3.5–5.2)
ALP SERPL-CCNC: 92 U/L (ref 55–135)
ALT SERPL W/O P-5'-P-CCNC: 95 U/L (ref 10–44)
ANION GAP SERPL CALC-SCNC: 6 MMOL/L (ref 8–16)
AST SERPL-CCNC: 83 U/L (ref 10–40)
BASOPHILS # BLD AUTO: 0.01 K/UL (ref 0–0.2)
BASOPHILS NFR BLD: 0.1 % (ref 0–1.9)
BILIRUB SERPL-MCNC: 0.9 MG/DL (ref 0.1–1)
BUN SERPL-MCNC: 25 MG/DL (ref 8–23)
CALCIUM SERPL-MCNC: 9.1 MG/DL (ref 8.7–10.5)
CHLORIDE SERPL-SCNC: 106 MMOL/L (ref 95–110)
CO2 SERPL-SCNC: 26 MMOL/L (ref 23–29)
CREAT SERPL-MCNC: 1.1 MG/DL (ref 0.5–1.4)
CRP SERPL-MCNC: 29.6 MG/L (ref 0–8.2)
D DIMER PPP IA.FEU-MCNC: 2.9 MG/L FEU
DIFFERENTIAL METHOD: ABNORMAL
EOSINOPHIL # BLD AUTO: 0 K/UL (ref 0–0.5)
EOSINOPHIL NFR BLD: 0 % (ref 0–8)
ERYTHROCYTE [DISTWIDTH] IN BLOOD BY AUTOMATED COUNT: 12.9 % (ref 11.5–14.5)
EST. GFR  (AFRICAN AMERICAN): >60 ML/MIN/1.73 M^2
EST. GFR  (NON AFRICAN AMERICAN): >60 ML/MIN/1.73 M^2
FERRITIN SERPL-MCNC: 490 NG/ML (ref 20–300)
GLUCOSE SERPL-MCNC: 102 MG/DL (ref 70–110)
HCT VFR BLD AUTO: 34.9 % (ref 40–54)
HGB BLD-MCNC: 11.9 G/DL (ref 14–18)
IMM GRANULOCYTES # BLD AUTO: 0.11 K/UL (ref 0–0.04)
IMM GRANULOCYTES NFR BLD AUTO: 1.5 % (ref 0–0.5)
LDH SERPL L TO P-CCNC: 485 U/L (ref 110–260)
LYMPHOCYTES # BLD AUTO: 0.4 K/UL (ref 1–4.8)
LYMPHOCYTES NFR BLD: 5.5 % (ref 18–48)
MAGNESIUM SERPL-MCNC: 2.4 MG/DL (ref 1.6–2.6)
MCH RBC QN AUTO: 33.4 PG (ref 27–31)
MCHC RBC AUTO-ENTMCNC: 34.1 G/DL (ref 32–36)
MCV RBC AUTO: 98 FL (ref 82–98)
MONOCYTES # BLD AUTO: 0.5 K/UL (ref 0.3–1)
MONOCYTES NFR BLD: 6.9 % (ref 4–15)
NEUTROPHILS # BLD AUTO: 6.1 K/UL (ref 1.8–7.7)
NEUTROPHILS NFR BLD: 86 % (ref 38–73)
NRBC BLD-RTO: 0 /100 WBC
PHOSPHATE SERPL-MCNC: 2.8 MG/DL (ref 2.7–4.5)
PLATELET # BLD AUTO: 217 K/UL (ref 150–350)
PMV BLD AUTO: 10.3 FL (ref 9.2–12.9)
POTASSIUM SERPL-SCNC: 4.1 MMOL/L (ref 3.5–5.1)
PROT SERPL-MCNC: 6.7 G/DL (ref 6–8.4)
RBC # BLD AUTO: 3.56 M/UL (ref 4.6–6.2)
SODIUM SERPL-SCNC: 138 MMOL/L (ref 136–145)
WBC # BLD AUTO: 7.12 K/UL (ref 3.9–12.7)

## 2020-10-27 PROCEDURE — 80053 COMPREHEN METABOLIC PANEL: CPT

## 2020-10-27 PROCEDURE — 85025 COMPLETE CBC W/AUTO DIFF WBC: CPT

## 2020-10-27 PROCEDURE — 36415 COLL VENOUS BLD VENIPUNCTURE: CPT

## 2020-10-27 PROCEDURE — 63600175 PHARM REV CODE 636 W HCPCS: Performed by: STUDENT IN AN ORGANIZED HEALTH CARE EDUCATION/TRAINING PROGRAM

## 2020-10-27 PROCEDURE — 84100 ASSAY OF PHOSPHORUS: CPT

## 2020-10-27 PROCEDURE — 99233 PR SUBSEQUENT HOSPITAL CARE,LEVL III: ICD-10-PCS | Mod: ,,, | Performed by: STUDENT IN AN ORGANIZED HEALTH CARE EDUCATION/TRAINING PROGRAM

## 2020-10-27 PROCEDURE — 94640 AIRWAY INHALATION TREATMENT: CPT

## 2020-10-27 PROCEDURE — 97116 GAIT TRAINING THERAPY: CPT

## 2020-10-27 PROCEDURE — 25000003 PHARM REV CODE 250: Performed by: STUDENT IN AN ORGANIZED HEALTH CARE EDUCATION/TRAINING PROGRAM

## 2020-10-27 PROCEDURE — 97535 SELF CARE MNGMENT TRAINING: CPT

## 2020-10-27 PROCEDURE — 99233 SBSQ HOSP IP/OBS HIGH 50: CPT | Mod: ,,, | Performed by: STUDENT IN AN ORGANIZED HEALTH CARE EDUCATION/TRAINING PROGRAM

## 2020-10-27 PROCEDURE — 20600001 HC STEP DOWN PRIVATE ROOM

## 2020-10-27 PROCEDURE — 83735 ASSAY OF MAGNESIUM: CPT

## 2020-10-27 PROCEDURE — 94761 N-INVAS EAR/PLS OXIMETRY MLT: CPT

## 2020-10-27 RX ORDER — SODIUM,POTASSIUM PHOSPHATES 280-250MG
1 POWDER IN PACKET (EA) ORAL ONCE
Status: COMPLETED | OUTPATIENT
Start: 2020-10-27 | End: 2020-10-27

## 2020-10-27 RX ADMIN — ALBUTEROL SULFATE 2 PUFF: 90 AEROSOL, METERED RESPIRATORY (INHALATION) at 08:10

## 2020-10-27 RX ADMIN — DEXAMETHASONE 6 MG: 4 TABLET ORAL at 08:10

## 2020-10-27 RX ADMIN — IPRATROPIUM BROMIDE 2 PUFF: 17 AEROSOL, METERED RESPIRATORY (INHALATION) at 01:10

## 2020-10-27 RX ADMIN — AMLODIPINE BESYLATE 5 MG: 5 TABLET ORAL at 08:10

## 2020-10-27 RX ADMIN — IPRATROPIUM BROMIDE 2 PUFF: 17 AEROSOL, METERED RESPIRATORY (INHALATION) at 08:10

## 2020-10-27 RX ADMIN — LACTOBACILLUS ACIDOPHILUS / LACTOBACILLUS BULGARICUS 1 EACH: 100 MILLION CFU STRENGTH GRANULES at 08:10

## 2020-10-27 RX ADMIN — ENOXAPARIN SODIUM 80 MG: 80 INJECTION SUBCUTANEOUS at 10:10

## 2020-10-27 RX ADMIN — QUETIAPINE FUMARATE 25 MG: 25 TABLET ORAL at 10:10

## 2020-10-27 RX ADMIN — ALBUTEROL SULFATE 2 PUFF: 90 AEROSOL, METERED RESPIRATORY (INHALATION) at 01:10

## 2020-10-27 RX ADMIN — HALOPERIDOL LACTATE 2 MG: 5 INJECTION, SOLUTION INTRAMUSCULAR at 06:10

## 2020-10-27 RX ADMIN — IPRATROPIUM BROMIDE 2 PUFF: 17 AEROSOL, METERED RESPIRATORY (INHALATION) at 10:10

## 2020-10-27 RX ADMIN — VENLAFAXINE 37.5 MG: 37.5 TABLET ORAL at 08:10

## 2020-10-27 RX ADMIN — ATORVASTATIN CALCIUM 40 MG: 20 TABLET, FILM COATED ORAL at 10:10

## 2020-10-27 RX ADMIN — REMDESIVIR 100 MG: 100 INJECTION, POWDER, LYOPHILIZED, FOR SOLUTION INTRAVENOUS at 04:10

## 2020-10-27 RX ADMIN — POTASSIUM & SODIUM PHOSPHATES POWDER PACK 280-160-250 MG 1 PACKET: 280-160-250 PACK at 09:10

## 2020-10-27 RX ADMIN — FOLIC ACID 1 MG: 1 TABLET ORAL at 08:10

## 2020-10-27 RX ADMIN — VENLAFAXINE 37.5 MG: 37.5 TABLET ORAL at 10:10

## 2020-10-27 RX ADMIN — ALBUTEROL SULFATE 2 PUFF: 90 AEROSOL, METERED RESPIRATORY (INHALATION) at 10:10

## 2020-10-27 RX ADMIN — LACTOBACILLUS ACIDOPHILUS / LACTOBACILLUS BULGARICUS 1 EACH: 100 MILLION CFU STRENGTH GRANULES at 10:10

## 2020-10-27 RX ADMIN — CYANOCOBALAMIN TAB 250 MCG 250 MCG: 250 TAB at 09:10

## 2020-10-27 RX ADMIN — MELATONIN TAB 3 MG 6 MG: 3 TAB at 10:10

## 2020-10-27 RX ADMIN — OXYCODONE HYDROCHLORIDE AND ACETAMINOPHEN 500 MG: 500 TABLET ORAL at 10:10

## 2020-10-27 RX ADMIN — OXYCODONE HYDROCHLORIDE AND ACETAMINOPHEN 500 MG: 500 TABLET ORAL at 08:10

## 2020-10-27 NOTE — PROGRESS NOTES
"   10/27/20 1030   Cognitive   Orientation disoriented to;place;time;situation   Mood/Behavior agitated;uncooperative   Tech responded to [employer] camera alarm - pt found to be getting out of his chair and unsteady on his feet.  Per tech, she attempted to get patient to sit back in chair & patient pushed her.  This nurse and RT responded to alarms & tech call for help.  Pt assisted back to chair, asking if call light remote is the one "customers call us with."  O2 sats down to 82% on room air after activity.  Responded well to 3LPM NC.  Attempted to re-orient patient to call light system and safety.  Opened patient's blinds, continue using Avasys monitor, yellow socks and fall risk band.  Pt able to calm down with 3 care providers at bedside, no physical or medication interventions needed at this time.  Pt declines assist to use television, music, and cell phone.  "

## 2020-10-27 NOTE — PLAN OF CARE
Problem: Occupational Therapy Goal  Goal: Occupational Therapy Goal  Description: Goals to be met by: 11/8/20     Patient will increase functional independence with ADLs by performing:    UE Dressing with Supervision.  LE Dressing with Supervision.  Grooming while standing with Supervision.  Toileting from toilet with Supervision for hygiene and clothing management.   Supine to sit with [place] in preparation for EOB/OOB functional activities.  Toilet transfer to toilet with Supervision.    Outcome: Ongoing, Progressing     Goals reviewed and remain appropriate, continue POC    ORLANDO Monteiro  10/27/2020   Pager #: [phone]

## 2020-10-27 NOTE — PLAN OF CARE
Trixie Hope called requesting a call from provider following his father. He is very concerned with the confusion and need for restraints and would like a call from Dr Hamilton as soon as possible to discuss his fathers mental status with possible upcoming discharge because he can be noncompliant and not follow provider instruction. Dr Hamilton notified of son's request via secure chat.       TRIXIE HOPE [phone]    Nicolle Machuca RN  Case Management  Ext [phone]

## 2020-10-27 NOTE — PT/OT/SLP PROGRESS
"Occupational Therapy   Treatment    Name: Naty Hope  MRN: [id number]  Admitting Diagnosis:  Pneumonia due to COVID-19 virus     *Co-treatment with PT  Recommendations:     Discharge Recommendations: home health OT(with 24/7 supervision)  Discharge Equipment Recommendations:  walker, rolling  Barriers to discharge:  None    Assessment:     Naty Hope is a 85 y.o. male with a medical diagnosis of Pneumonia due to COVID-19 virus.  He presents with impaired safety awareness and poor insight. Pt oriented to person, place, and year but does not believe he is sick. Pt found seated in bedside chair out of restraints, initially hesitant to participate in OT/PT session stating he was "busy." Pt agreeable to performing standing ADLs with encouragement. Pt impulsively stood with no attention to lines and demonstrated impaired safety awareness and major LOB. Pt attempting to wash hands at sink with mouthwash, requiring max verbal cues to attention to task.  Performance deficits affecting function are weakness, impaired endurance, impaired self care skills, impaired functional mobilty, gait instability, impaired balance, impaired cognition, decreased safety awareness. Pt would benefit from skilled OT services in order to maximize independence with ADLs and facilitate safe discharge. Patient will require 24/7 supervision for safety if he discharges home.     Rehab Prognosis:  Fair; patient would benefit from acute skilled OT services to address these deficits and reach maximum level of function.       Plan:     Patient to be seen 3 x/week to address the above listed problems via self-care/home management, therapeutic activities, therapeutic exercises  · Plan of Care Expires: 11/25/20  · Plan of Care Reviewed with: patient    Subjective     "I can't disclose that right now" pt stated when therapist asked what he was busy doing     Pain/Comfort:  · Pain Rating 1: 0/10    Objective:     Communicated with: RN and " PT prior to session.  Patient found up in chair with pulse ox (continuous), telemetry, blood pressure cuff([employer] camera) upon OT entry to room.    General Precautions: Standard, airborne, contact, droplet, fall   Orthopedic Precautions:N/A   Braces: N/A     Occupational Performance:     Bed Mobility:    · Did not observe    Functional Mobility/Transfers:  · Patient completed Sit <> Stand Transfer with stand by assistance  with  no assistive device   · Functional Mobility: Pt ambulated chair <> bathroom SBA-mod A  in order to maximize functional activity tolerance and standing balance required for engagement in occupations of choice. Frequent LOB with poor reception to cues. No SOB or c/o of dizziness.     Activities of Daily Living:  · Grooming: moderate assistance for sequencing while performing hand and facial hygiene standing at the sink; Pt initally attempting to wash hands with mouthwash      AMPAC 6 Click ADL: 20    Treatment & Education:  -Therapist provided facilitation and instruction of proper body mechanics, energy conservation, and fall prevention strategies during tasks listed above.  -Co-tx with PT performed due to need for education and assistance from two skilled therapy disciplines at pt's current functional level.    -Pt educated on role of OT, POC and goals for therapy  -Pt educated on importance of OOB activities with staff member assistance and sitting OOB majority of the day.   -Pt verbalized understanding. Pt expressed no further concerns/questions  -Whiteboard updated  -Avmeghans contacted to ensure pt was visible    Patient left up in chair with all lines intact, call button in reach, Rn notified and [employer] camera presentEducation:      GOALS:   Multidisciplinary Problems     Occupational Therapy Goals        Problem: Occupational Therapy Goal    Goal Priority Disciplines Outcome Interventions   Occupational Therapy Goal     OT, PT/OT Ongoing, Progressing    Description: Goals to be met by:  11/8/20     Patient will increase functional independence with ADLs by performing:    UE Dressing with Supervision.  LE Dressing with Supervision.  Grooming while standing with Supervision.  Toileting from toilet with Supervision for hygiene and clothing management.   Supine to sit with [place] in preparation for EOB/OOB functional activities.  Toilet transfer to toilet with Supervision.                     Time Tracking:     OT Date of Treatment: 10/27/20  OT Start Time: 1101  OT Stop Time: 1113  OT Total Time (min): 12 min    Billable Minutes:Self Care/Home Management 12    Tessa Rolon, OT  10/27/2020

## 2020-10-27 NOTE — PT/OT/SLP PROGRESS
Physical Therapy Treatment    Patient Name:  Naty Hope   MRN:  [id number]    Recommendations:     Discharge Recommendations:  (HHPT with 24/7 supervision)   Discharge Equipment Recommendations: walker, rolling   Barriers to discharge: poor safety awareness increasing fall risk     Assessment:     Naty Hope is a 85 y.o. male admitted with a medical diagnosis of Pneumonia due to COVID-19 virus.  He presents with the following impairments/functional limitations:  weakness, impaired endurance, impaired cognition, decreased coordination, impaired coordination, impaired self care skills, impaired functional mobilty, decreased lower extremity function, gait instability, decreased safety awareness, impaired balance. Pt found sitting up in bedside chair out of restraints. Pt oriented to person, place, and time but denies having any sickness(Covid specifically) at this time. Pt required fluctuating levels of assist on this date ranging from SBA-Max A d/t poor safety awareness. Pt attempted to declined assist during amb but d/t multiple LOB required Max A to recover. Pt not appropriate to increase gait training outside of room d/t poor safety awareness and difficulty re-directing but would benefit from continued skilled acute PT 3x/wk to improve functional mobility.  Recommending pt receive PT services in HH setting with 24/7 supervision following discharge from hospital once medically cleared.     Rehab Prognosis: Fair; patient would benefit from acute skilled PT services to address these deficits and reach maximum level of function.    Recent Surgery: * No surgery found *      Plan:     During this hospitalization, patient to be seen 3 x/week to address the identified rehab impairments via gait training, therapeutic exercises, therapeutic activities, neuromuscular re-education and progress toward the following goals:    · Plan of Care Expires:  11/23/20    Subjective     Chief Complaint: none noted  "  Patient/Family Comments/goals: "I can't disclose that right now" -when asked what are you doing?  Pain/Comfort:  · Pain Rating 1: 0/10      Objective:     Communicated with NSG prior to session.  Patient found up in chair with   upon PT entry to room.     General Precautions: Standard, airborne, contact, droplet, fall   Orthopedic Precautions:    Braces:       Functional Mobility:  · Transfers:     · Sit to Stand:  stand by assistance with no AD  · Gait: 16ft x2 flucutating SBA-Max A without AD as pt refused assist but demonstrated multiple large LOB  · Balance: Sitting: SBA     Standing: SBA-Mod A      AM-PAC 6 CLICK MOBILITY  Turning over in bed (including adjusting bedclothes, sheets and blankets)?: 4  Sitting down on and standing up from a chair with arms (e.g., wheelchair, bedside commode, etc.): 3  Moving from lying on back to sitting on the side of the bed?: 4  Moving to and from a bed to a chair (including a wheelchair)?: 3  Need to walk in hospital room?: 2  Climbing 3-5 steps with a railing?: 2  Basic Mobility Total Score: 18       Therapeutic Activities and Exercises:   - Static Standing: 3mins SBA-Mod A performing ADL task at the sink   - Pt educated on:   -PT roles, expectations, and POC    -Safety with mobility   -Benefits of OOB activities to increase strength and functional mobility    -Performing ther ex for increasing LE ROM and strength   -Discharge recommendations     Patient left up in chair with all lines intact and call button in reach..    GOALS:   Multidisciplinary Problems     Physical Therapy Goals        Problem: Physical Therapy Goal    Goal Priority Disciplines Outcome Goal Variances Interventions   Physical Therapy Goal     PT, PT/OT Ongoing, Progressing     Description: Goals to be met by: 20    Patient will increase functional independence with mobility by performin. Supine to sit with Modified [place] -not met  2. Sit to stand transfer with Modified [place] " -not met  3. Gait  x 250 feet with Supervision maintaining oxygen saturations -not met  4. Pt perform AROM therapeutic exercises x 15 reps BLE in sitting in increased tolerance to activities and increase functional mobility- not met                     Time Tracking:     PT Received On: 10/27/20  PT Start Time: 1101     PT Stop Time: 1113  PT Total Time (min): 12 min     Billable Minutes: Gait Training 12       PT/PTA: PT     PTA Visit Number: 0     Viktor Marcelino, PT  10/27/2020

## 2020-10-27 NOTE — PLAN OF CARE
10/27/20 1254   Post-Acute Status   Post-Acute Authorization Placement   Post-Acute Placement Status Referrals Sent       Patient is in need of SNF placement post discharge. SW sent referrals via  and will continue to follow up. SW spoke to patient son Wilfredo and he reports that he would like patient to be admitted in to a SNF in [place] or [place], [place].      Shania León, NAY  [facility]   [phone]

## 2020-10-27 NOTE — PLAN OF CARE
Pt continues to display confused behaviors, pt started on seroquel at bedtime, pt able to rest comfortably with medication, Pt continues to require 4 point restraints until confusion is better and pt able to maintain stable mentation at baseline. Pt son concerned with pt confusion. VSS, no obvious SS of infection beyond Covid/PNA. Pt has remaining doses of Remdesivir to be administered. Pt on RA through night. WCTM    0500 Pt remains confused but less agitated. Pt awake and more easily redirected. Pt able to better understand situation and that he is in hospital.     0540 Pt up in recliner, currently out of restraints, no attempts to get up since pt placed in recliner.  Curtains open for pt to orient that he is in the hospital.

## 2020-10-27 NOTE — NURSING
Pt resting in bed, VSS, tele D/C'd, pulse ox cord changed and now working, pt remains very confused, knows self and date. Difficult to reorient at times, pt medicated per MAR. Pt in 4 point soft restraints, pt continues to pull at restraints.

## 2020-10-27 NOTE — PROGRESS NOTES
Hospital Medicine   Progress note   Team: [facility] [place] [facility]OSP MED R Nathanael Hamilton MD   Admit Date: 10/24/2020   CHECO 10/28/2020   Code status: Full Code   Principal Problem: Pneumonia due to COVID-19 virus     Hospital Course:  Mr Naty Hope is an 85-year-old m with a PMH chronic fatigue and HTN presented to [facility] via EMS for emergent evaluation of multiple near syncopal episodes and a fall.  Pt reports that he started to have symptoms including myalgias, cough, lightheadedness, fatigue starting 4 days ago. Was seen at an urgent care at that time. Was started on a course of azithromycin. The following day, about 3 days ago, he was called and told that the result of a SARSCOV2 test was positive. On the day of presentation he left his house for the first time in days, experienced near syncope and fell, scraping his right arm against a wall on the way down. A bystander called EMS. Pt reportedly incontinent of urine and stool en route. Pt denies loss of consciousness. Denies recent sick contacts. No fever, chills, nausea, diarrhea. Reports poor appetite over past several days. Has never had a similar event.      In the ED BP 86/69 T 99.1° HR 86 R 18 SpO2 82% (RA) GCS 15.  WBC 7.5 Hb 12.5 Na 134 Cr 1.6 BNP 27 LA 2.8 COVID pos.  CXR pos for infiltrate at the left base and probably at the right upper lung field. Patient given IVF 30 cc/kilogram and O2 4 L nasal catheter with improvement in BP to 120/58 and SpO2 to high 90s.  Patient also given dexamethasone 10 mg IV.  CTH showed no acute changes. Also, BC x 2. Patient started on levofloxacin.      Vital signs upon transfer arrival all normal except pt requiring 3 L NC.     Started remdesivir 10/25. D-dimer elevated to 17 on 10/25. Started therapeutic lovenox. Stopped levaquin as no sufficient evidence of coinfection and concern for contribution to altered mental status.     Interval hx:   Pt remained confused overnight despite quetiapine. Only  intermittently requiring O2. Held long conversation with son. Son concerned about safety of family if pt returns home. Son does not want pt in son's home when acutely infectious. Also concerned that they will not be able to provide as much supervision as needed at this time. Pt denies current confusion but does admit to confusion over night. Denies current fever, chills. Reports dyspnea improving.     ROS   Constitutional: No fever. Weakness.   CV: No chest pain, edema, palpitations  Resp: SOB, cough. No sputum production  GI: No changes in appetite, NVDC, pain, melena, hematochezia, GERD, hematemesis  : No Dysuria, hematuria, urinary urgency, frequency  MSK: No arthralgia/myalgia, joint swelling  Neuro/Psych: No anxiety, depression    PEx   Temp:  [97.2 °F (36.2 °C)-98.1 °F (36.7 °C)]   Pulse:  []   Resp:  [14-26]   BP: (118-139)/(66-90)   SpO2:  [89 %-96 %]      I & O (Last 24H):     Intake/Output Summary (Last 24 hours) at 10/27/2020 0850  Last data filed at 10/27/2020 0700  Gross per 24 hour   Intake 0 ml   Output 350 ml   Net -350 ml       General appearance: mild distress  Mental status: Alert. Oriented x 3 but with tangential speech, some memory issues, confabulations about recent events.    HEENT:  conjunctivae/corneas clear, PERRL  Neck: supple, thyroid not enlarged  Pulm:   normal respiratory effort. Coarse sounds over lower lung fields. Comfortable on 2l NC.    Card: RRR, S1, S2 normal, no murmur, click, rub or gallop  Abd: soft, NT, ND, BS present; no masses, no organomegaly  Ext: no c/c/e. Missing parts of 2 digits on L hand.   Pulses: 2+, symmetric  Skin: color, texture, turgor normal. Abrasions over R forearm.   Neuro: Cranial Nerves grossly intact, no focal numbness or weakness, normal strength and tone     Recent Results (from the past 24 hour(s))   C-Reactive Protein    Collection Time: 10/26/20 11:32 PM   Result Value Ref Range    CRP 29.6 (H) 0.0 - 8.2 mg/L   Lactate Dehydrogenase     Collection Time: 10/26/20 11:32 PM   Result Value Ref Range     (H) 110 - 260 U/L   Ferritin    Collection Time: 10/26/20 11:32 PM   Result Value Ref Range    Ferritin 490 (H) 20.0 - 300.0 ng/mL   D-Dimer, Quantitative    Collection Time: 10/26/20 11:32 PM   Result Value Ref Range    D-Dimer 2.90 (H) <0.50 mg/L FEU   Comprehensive Metabolic Panel (CMP)    Collection Time: 10/27/20  6:16 AM   Result Value Ref Range    Sodium 138 136 - 145 mmol/L    Potassium 4.1 3.5 - 5.1 mmol/L    Chloride 106 95 - 110 mmol/L    CO2 26 23 - 29 mmol/L    Glucose 102 70 - 110 mg/dL    BUN, Bld 25 (H) 8 - 23 mg/dL    Creatinine 1.1 0.5 - 1.4 mg/dL    Calcium 9.1 8.7 - 10.5 mg/dL    Total Protein 6.7 6.0 - 8.4 g/dL    Albumin 3.3 (L) 3.5 - 5.2 g/dL    Total Bilirubin 0.9 0.1 - 1.0 mg/dL    Alkaline Phosphatase 92 55 - 135 U/L    AST 83 (H) 10 - 40 U/L    ALT 95 (H) 10 - 44 U/L    Anion Gap 6 (L) 8 - 16 mmol/L    eGFR if African American >60.0 >60 mL/min/1.73 m^2    eGFR if non African American >60.0 >60 mL/min/1.73 m^2   Magnesium    Collection Time: 10/27/20  6:16 AM   Result Value Ref Range    Magnesium 2.4 1.6 - 2.6 mg/dL   Phosphorus    Collection Time: 10/27/20  6:16 AM   Result Value Ref Range    Phosphorus 2.8 2.7 - 4.5 mg/dL   CBC with Automated Differential    Collection Time: 10/27/20  6:16 AM   Result Value Ref Range    WBC 7.12 3.90 - 12.70 K/uL    RBC 3.56 (L) 4.60 - 6.20 M/uL    Hemoglobin 11.9 (L) 14.0 - 18.0 g/dL    Hematocrit 34.9 (L) 40.0 - 54.0 %    Mean Corpuscular Volume 98 82 - 98 fL    Mean Corpuscular Hemoglobin 33.4 (H) 27.0 - 31.0 pg    Mean Corpuscular Hemoglobin Conc 34.1 32.0 - 36.0 g/dL    RDW 12.9 11.5 - 14.5 %    Platelets 217 150 - 350 K/uL    MPV 10.3 9.2 - 12.9 fL    Immature Granulocytes 1.5 (H) 0.0 - 0.5 %    Gran # (ANC) 6.1 1.8 - 7.7 K/uL    Immature Grans (Abs) 0.11 (H) 0.00 - 0.04 K/uL    Lymph # 0.4 (L) 1.0 - 4.8 K/uL    Mono # 0.5 0.3 - 1.0 K/uL    Eos # 0.0 0.0 - 0.5 K/uL    Baso # 0.01  0.00 - 0.20 K/uL    nRBC 0 0 /100 WBC    Gran% 86.0 (H) 38.0 - 73.0 %    Lymph% 5.5 (L) 18.0 - 48.0 %    Mono% 6.9 4.0 - 15.0 %    Eosinophil% 0.0 0.0 - 8.0 %    Basophil% 0.1 0.0 - 1.9 %    Differential Method Automated        No results for input(s): POCTGLUCOSE in the last 168 hours.    No results found for: HGBA1C     Active Hospital Problems    Diagnosis  POA    *Pneumonia due to COVID-19 virus [U07.1, J12.89]  Yes      Resolved Hospital Problems   No resolved problems to display.        Overview:    Assessment and Plan for problems addressed today:   COVID-19 Virus Infection:  Viral Pneumonia due to COVID-19:  -Pt tested for COVID-19 and noted to be positive on 10/24  -Isolation: Airborne/Droplet. Surgical mask on patient. Notify Infection Control  -Management: per [facility] COVID Treatment Protocol (4/15/20)  -Monitoring: Telemetry & Continuous Pulse Oximetry  -Antibiotics: Continue levofloxacin 750 mg qd  -Nutrition:               -Continue Boost supplement              -Stopped Vitamin D as not deficient              -Ascorbic acid 500mg PO bid  -Supportive Care:              -Acetaminophen 650mg PO Q6hr PRN fever/headache              -Loperamide PRN viral diarrhea              -Robitussin, tessalon perls for cough   -Investigational Therapies:  -Atorvastatin 40mg po daily              -Due to hypoxia, pt started on dexamethasone 6mg PO daily up to 10 days or until pt is discharged from hospital (whichever is sooner)              -Started remdesivir 10/25.      Acute Hypoxemic Respiratory Failure  -RT consult via Respiratory Communication for COVID Protocols  -If wheezing, albuterol INH Q6h scheduled & PRN  -Incentive Spirometer Q4h  -Flutter Valve Q4h  -Continuous pulse oximetry; titrate oxygen to keep sats between 92-96%  -Wean off O2 as tolerated    -Supplemental O2 via LFNC, VentiMask, or HFNC (see Respiratory Support Oxygen Therapies)  -Proning Protocol if patient is a candidate with GCS >13 and able  to self-prone (see  Proning Protocol)  -If deterioration, may warrant trial of NIPPV and transfer to neg pressure room or immediate ICU consult     Altered mental status  -suspect metabolic encephalopathy in setting of viral sepsis  -supplementing B12, folate  -f/u TSH, ammonia    Presyncope with fall  -orthostatics negative  -unlikely heart failure related as BNP completely normal  -Appreciate PT/OT recs- home PT/OT     Macrocytic anemia  -c/w b12 supplementation. Oral in place of home monthly injection  -start folate supplement given borderline low level     HTN  -c/w home amlodipine     Chronic fatigue syndrome  -c/w home venlafaxine      Goals of care, counseling/discussion  -Reviewed the typical clinical course of COVID19 with patient, including the potential for acute decompensation requiring intubation and mechanical ventilation  -Pt currently maintains code status of full code.       VTE High Risk Prophylaxis: therapeutic lovenox    Discharge Planning   CHECO: 10/28/2020     Code Status: Full Code   Is the patient medically ready for discharge?: No    Reason for patient still in hospital (select all that apply): Patient unstable, Patient trending condition, Treatment and PT / OT recommendations  Discharge Plan A: Home with family, Home Health        Nathanael Hamilton MD  [facility] Medicine Staff  [phone] Pager

## 2020-10-28 LAB
ALBUMIN SERPL BCP-MCNC: 3 G/DL (ref 3.5–5.2)
ALP SERPL-CCNC: 83 U/L (ref 55–135)
ALT SERPL W/O P-5'-P-CCNC: 76 U/L (ref 10–44)
AMMONIA PLAS-SCNC: 47 UMOL/L (ref 10–50)
ANION GAP SERPL CALC-SCNC: 8 MMOL/L (ref 8–16)
AST SERPL-CCNC: 60 U/L (ref 10–40)
BASOPHILS # BLD AUTO: 0.01 K/UL (ref 0–0.2)
BASOPHILS NFR BLD: 0.2 % (ref 0–1.9)
BILIRUB SERPL-MCNC: 0.7 MG/DL (ref 0.1–1)
BUN SERPL-MCNC: 28 MG/DL (ref 8–23)
CALCIUM SERPL-MCNC: 8.6 MG/DL (ref 8.7–10.5)
CHLORIDE SERPL-SCNC: 109 MMOL/L (ref 95–110)
CO2 SERPL-SCNC: 24 MMOL/L (ref 23–29)
CREAT SERPL-MCNC: 1.1 MG/DL (ref 0.5–1.4)
DIFFERENTIAL METHOD: ABNORMAL
EOSINOPHIL # BLD AUTO: 0 K/UL (ref 0–0.5)
EOSINOPHIL NFR BLD: 0.2 % (ref 0–8)
ERYTHROCYTE [DISTWIDTH] IN BLOOD BY AUTOMATED COUNT: 12.9 % (ref 11.5–14.5)
EST. GFR  (AFRICAN AMERICAN): >60 ML/MIN/1.73 M^2
EST. GFR  (NON AFRICAN AMERICAN): >60 ML/MIN/1.73 M^2
GLUCOSE SERPL-MCNC: 91 MG/DL (ref 70–110)
HCT VFR BLD AUTO: 33.5 % (ref 40–54)
HGB BLD-MCNC: 11.1 G/DL (ref 14–18)
IMM GRANULOCYTES # BLD AUTO: 0.14 K/UL (ref 0–0.04)
IMM GRANULOCYTES NFR BLD AUTO: 2.3 % (ref 0–0.5)
LYMPHOCYTES # BLD AUTO: 0.7 K/UL (ref 1–4.8)
LYMPHOCYTES NFR BLD: 11.9 % (ref 18–48)
MAGNESIUM SERPL-MCNC: 2.3 MG/DL (ref 1.6–2.6)
MCH RBC QN AUTO: 32.9 PG (ref 27–31)
MCHC RBC AUTO-ENTMCNC: 33.1 G/DL (ref 32–36)
MCV RBC AUTO: 99 FL (ref 82–98)
MONOCYTES # BLD AUTO: 0.5 K/UL (ref 0.3–1)
MONOCYTES NFR BLD: 8.8 % (ref 4–15)
NEUTROPHILS # BLD AUTO: 4.6 K/UL (ref 1.8–7.7)
NEUTROPHILS NFR BLD: 76.6 % (ref 38–73)
NRBC BLD-RTO: 0 /100 WBC
PHOSPHATE SERPL-MCNC: 2.5 MG/DL (ref 2.7–4.5)
PLATELET # BLD AUTO: 193 K/UL (ref 150–350)
PMV BLD AUTO: 10.6 FL (ref 9.2–12.9)
POTASSIUM SERPL-SCNC: 3.8 MMOL/L (ref 3.5–5.1)
PROT SERPL-MCNC: 6 G/DL (ref 6–8.4)
RBC # BLD AUTO: 3.37 M/UL (ref 4.6–6.2)
SODIUM SERPL-SCNC: 141 MMOL/L (ref 136–145)
TSH SERPL DL<=0.005 MIU/L-ACNC: 1.44 UIU/ML (ref 0.4–4)
WBC # BLD AUTO: 6.05 K/UL (ref 3.9–12.7)

## 2020-10-28 PROCEDURE — 20600001 HC STEP DOWN PRIVATE ROOM

## 2020-10-28 PROCEDURE — 83735 ASSAY OF MAGNESIUM: CPT

## 2020-10-28 PROCEDURE — 94640 AIRWAY INHALATION TREATMENT: CPT

## 2020-10-28 PROCEDURE — 84100 ASSAY OF PHOSPHORUS: CPT

## 2020-10-28 PROCEDURE — 99233 PR SUBSEQUENT HOSPITAL CARE,LEVL III: ICD-10-PCS | Mod: ,,, | Performed by: STUDENT IN AN ORGANIZED HEALTH CARE EDUCATION/TRAINING PROGRAM

## 2020-10-28 PROCEDURE — 27000221 HC OXYGEN, UP TO 24 HOURS

## 2020-10-28 PROCEDURE — 36415 COLL VENOUS BLD VENIPUNCTURE: CPT

## 2020-10-28 PROCEDURE — 80053 COMPREHEN METABOLIC PANEL: CPT

## 2020-10-28 PROCEDURE — 85025 COMPLETE CBC W/AUTO DIFF WBC: CPT

## 2020-10-28 PROCEDURE — 99900035 HC TECH TIME PER 15 MIN (STAT)

## 2020-10-28 PROCEDURE — 25000003 PHARM REV CODE 250: Performed by: STUDENT IN AN ORGANIZED HEALTH CARE EDUCATION/TRAINING PROGRAM

## 2020-10-28 PROCEDURE — 84443 ASSAY THYROID STIM HORMONE: CPT

## 2020-10-28 PROCEDURE — 63600175 PHARM REV CODE 636 W HCPCS: Performed by: STUDENT IN AN ORGANIZED HEALTH CARE EDUCATION/TRAINING PROGRAM

## 2020-10-28 PROCEDURE — 94761 N-INVAS EAR/PLS OXIMETRY MLT: CPT

## 2020-10-28 PROCEDURE — 82140 ASSAY OF AMMONIA: CPT

## 2020-10-28 PROCEDURE — 99233 SBSQ HOSP IP/OBS HIGH 50: CPT | Mod: ,,, | Performed by: STUDENT IN AN ORGANIZED HEALTH CARE EDUCATION/TRAINING PROGRAM

## 2020-10-28 RX ORDER — SODIUM,POTASSIUM PHOSPHATES 280-250MG
2 POWDER IN PACKET (EA) ORAL
Status: COMPLETED | OUTPATIENT
Start: 2020-10-28 | End: 2020-10-28

## 2020-10-28 RX ORDER — POTASSIUM CHLORIDE 1.5 G/1.58G
20 POWDER, FOR SOLUTION ORAL ONCE
Status: COMPLETED | OUTPATIENT
Start: 2020-10-28 | End: 2020-10-28

## 2020-10-28 RX ADMIN — ALBUTEROL SULFATE 2 PUFF: 90 AEROSOL, METERED RESPIRATORY (INHALATION) at 01:10

## 2020-10-28 RX ADMIN — REMDESIVIR 100 MG: 100 INJECTION, POWDER, LYOPHILIZED, FOR SOLUTION INTRAVENOUS at 04:10

## 2020-10-28 RX ADMIN — ATORVASTATIN CALCIUM 40 MG: 20 TABLET, FILM COATED ORAL at 09:10

## 2020-10-28 RX ADMIN — POTASSIUM & SODIUM PHOSPHATES POWDER PACK 280-160-250 MG 2 PACKET: 280-160-250 PACK at 09:10

## 2020-10-28 RX ADMIN — VENLAFAXINE 37.5 MG: 37.5 TABLET ORAL at 10:10

## 2020-10-28 RX ADMIN — AMLODIPINE BESYLATE 5 MG: 5 TABLET ORAL at 09:10

## 2020-10-28 RX ADMIN — QUETIAPINE FUMARATE 25 MG: 25 TABLET ORAL at 09:10

## 2020-10-28 RX ADMIN — HALOPERIDOL LACTATE 2 MG: 5 INJECTION, SOLUTION INTRAMUSCULAR at 10:10

## 2020-10-28 RX ADMIN — MELATONIN TAB 3 MG 6 MG: 3 TAB at 09:10

## 2020-10-28 RX ADMIN — DEXAMETHASONE 6 MG: 4 TABLET ORAL at 09:10

## 2020-10-28 RX ADMIN — ALBUTEROL SULFATE 2 PUFF: 90 AEROSOL, METERED RESPIRATORY (INHALATION) at 07:10

## 2020-10-28 RX ADMIN — OXYCODONE HYDROCHLORIDE AND ACETAMINOPHEN 500 MG: 500 TABLET ORAL at 09:10

## 2020-10-28 RX ADMIN — ENOXAPARIN SODIUM 80 MG: 80 INJECTION SUBCUTANEOUS at 10:10

## 2020-10-28 RX ADMIN — LACTOBACILLUS ACIDOPHILUS / LACTOBACILLUS BULGARICUS 1 EACH: 100 MILLION CFU STRENGTH GRANULES at 09:10

## 2020-10-28 RX ADMIN — IPRATROPIUM BROMIDE 2 PUFF: 17 AEROSOL, METERED RESPIRATORY (INHALATION) at 07:10

## 2020-10-28 RX ADMIN — VENLAFAXINE 37.5 MG: 37.5 TABLET ORAL at 09:10

## 2020-10-28 RX ADMIN — BENZONATATE 100 MG: 100 CAPSULE ORAL at 10:10

## 2020-10-28 RX ADMIN — CYANOCOBALAMIN TAB 250 MCG 250 MCG: 250 TAB at 09:10

## 2020-10-28 RX ADMIN — IPRATROPIUM BROMIDE 2 PUFF: 17 AEROSOL, METERED RESPIRATORY (INHALATION) at 01:10

## 2020-10-28 RX ADMIN — ALBUTEROL SULFATE 2 PUFF: 90 AEROSOL, METERED RESPIRATORY (INHALATION) at 09:10

## 2020-10-28 RX ADMIN — POTASSIUM & SODIUM PHOSPHATES POWDER PACK 280-160-250 MG 2 PACKET: 280-160-250 PACK at 04:10

## 2020-10-28 RX ADMIN — IPRATROPIUM BROMIDE 2 PUFF: 17 AEROSOL, METERED RESPIRATORY (INHALATION) at 09:10

## 2020-10-28 RX ADMIN — POTASSIUM CHLORIDE 20 MEQ: 1.5 POWDER, FOR SOLUTION ORAL at 02:10

## 2020-10-28 RX ADMIN — FOLIC ACID 1 MG: 1 TABLET ORAL at 09:10

## 2020-10-28 RX ADMIN — LACTOBACILLUS ACIDOPHILUS / LACTOBACILLUS BULGARICUS 1 EACH: 100 MILLION CFU STRENGTH GRANULES at 10:10

## 2020-10-28 NOTE — PROGRESS NOTES
Hospital Medicine   Progress note   Team: [facility] MED R Nathanael Hamilton MD   Admit Date: 10/24/2020   CHECO 10/30/2020   Code status: Full Code   Principal Problem: Pneumonia due to COVID-19 virus     Hospital Course:  Mr Naty Hope is an 85-year-old m with a PMH chronic fatigue and HTN presented to [facility] via EMS for emergent evaluation of multiple near syncopal episodes and a fall.  Pt reports that he started to have symptoms including myalgias, cough, lightheadedness, fatigue starting 4 days ago. Was seen at an urgent care at that time. Was started on a course of azithromycin. The following day, about 3 days ago, he was called and told that the result of a SARSCOV2 test was positive. On the day of presentation he left his house for the first time in days, experienced near syncope and fell, scraping his right arm against a wall on the way down. A bystander called EMS. Pt reportedly incontinent of urine and stool en route. Pt denies loss of consciousness. Denies recent sick contacts. No fever, chills, nausea, diarrhea. Reports poor appetite over past several days. Has never had a similar event.      In the ED BP 86/69 T 99.1° HR 86 R 18 SpO2 82% (RA) GCS 15.  WBC 7.5 Hb 12.5 Na 134 Cr 1.6 BNP 27 LA 2.8 COVID pos.  CXR pos for infiltrate at the left base and probably at the right upper lung field. Patient given IVF 30 cc/kilogram and O2 4 L nasal catheter with improvement in BP to 120/58 and SpO2 to high 90s.  Patient also given dexamethasone 10 mg IV.  CTH showed no acute changes. Also, BC x 2. Patient started on levofloxacin.      Vital signs upon transfer arrival all normal except pt requiring 3 L NC.     Started remdesivir 10/25. D-dimer elevated to 17 on 10/25. Started therapeutic lovenox. Stopped levaquin as no sufficient evidence of coinfection and concern for contribution to altered mental status. Started quetiapine for nighttime delirium.     Held long conversation with son on  10/27. Son concerned about safety of family if pt returns home. Son does not want pt in son's home when acutely infectious. Also concerned that they will not be able to provide as much supervision as needed at this time.     Interval hx:   Hypoxia stable. Continues to have minimal O2 requirement, 1-2 L NC. Continues to have episodes of confusion with occasional combativeness. Pt denies any current fever, chills, dyspnea. Remains somewhat disoriented though better during the day.     ROS   Constitutional: No fever. Weakness.   CV: No chest pain, edema, palpitations  Resp: SOB, cough. No sputum production  GI: No changes in appetite, NVDC, pain, melena, hematochezia, GERD, hematemesis  : No Dysuria, hematuria, urinary urgency, frequency  MSK: No arthralgia/myalgia, joint swelling  Neuro/Psych: Confusion. No anxiety, depression    PEx   Temp:  [97.7 °F (36.5 °C)-98.3 °F (36.8 °C)]   Pulse:  [65-87]   Resp:  [16-25]   BP: (118-164)/(67-86)   SpO2:  [89 %-97 %]      I & O (Last 24H):     Intake/Output Summary (Last 24 hours) at 10/28/2020 1728  Last data filed at 10/28/2020 1719  Gross per 24 hour   Intake 910 ml   Output --   Net 910 ml       General appearance: mild distress  Mental status: Alert. Oriented to self and knows that he is in [place]. Speech tangential.     HEENT:  conjunctivae/corneas clear, PERRL  Neck: supple, thyroid not enlarged  Pulm:   normal respiratory effort. Coarse sounds over lower lung fields. Comfortable on RA at time of exam though sats currently < 90.   Card: RRR, S1, S2 normal, no murmur, click, rub or gallop  Abd: soft, NT, ND, BS present; no masses, no organomegaly  Ext: no c/c/e. Missing parts of 2 digits on L hand.   Pulses: 2+, symmetric  Skin: color, texture, turgor normal. Abrasions over R forearm.   Neuro: Cranial Nerves grossly intact, no focal numbness or weakness, normal strength and tone     Recent Results (from the past 24 hour(s))   Comprehensive Metabolic Panel (CMP)     Collection Time: 10/28/20  5:23 AM   Result Value Ref Range    Sodium 141 136 - 145 mmol/L    Potassium 3.8 3.5 - 5.1 mmol/L    Chloride 109 95 - 110 mmol/L    CO2 24 23 - 29 mmol/L    Glucose 91 70 - 110 mg/dL    BUN 28 (H) 8 - 23 mg/dL    Creatinine 1.1 0.5 - 1.4 mg/dL    Calcium 8.6 (L) 8.7 - 10.5 mg/dL    Total Protein 6.0 6.0 - 8.4 g/dL    Albumin 3.0 (L) 3.5 - 5.2 g/dL    Total Bilirubin 0.7 0.1 - 1.0 mg/dL    Alkaline Phosphatase 83 55 - 135 U/L    AST 60 (H) 10 - 40 U/L    ALT 76 (H) 10 - 44 U/L    Anion Gap 8 8 - 16 mmol/L    eGFR if African American >60.0 >60 mL/min/1.73 m^2    eGFR if non African American >60.0 >60 mL/min/1.73 m^2   Magnesium    Collection Time: 10/28/20  5:23 AM   Result Value Ref Range    Magnesium 2.3 1.6 - 2.6 mg/dL   Phosphorus    Collection Time: 10/28/20  5:23 AM   Result Value Ref Range    Phosphorus 2.5 (L) 2.7 - 4.5 mg/dL   CBC with Automated Differential    Collection Time: 10/28/20  5:23 AM   Result Value Ref Range    WBC 6.05 3.90 - 12.70 K/uL    RBC 3.37 (L) 4.60 - 6.20 M/uL    Hemoglobin 11.1 (L) 14.0 - 18.0 g/dL    Hematocrit 33.5 (L) 40.0 - 54.0 %    MCV 99 (H) 82 - 98 fL    MCH 32.9 (H) 27.0 - 31.0 pg    MCHC 33.1 32.0 - 36.0 g/dL    RDW 12.9 11.5 - 14.5 %    Platelets 193 150 - 350 K/uL    MPV 10.6 9.2 - 12.9 fL    Immature Granulocytes 2.3 (H) 0.0 - 0.5 %    Gran # (ANC) 4.6 1.8 - 7.7 K/uL    Immature Grans (Abs) 0.14 (H) 0.00 - 0.04 K/uL    Lymph # 0.7 (L) 1.0 - 4.8 K/uL    Mono # 0.5 0.3 - 1.0 K/uL    Eos # 0.0 0.0 - 0.5 K/uL    Baso # 0.01 0.00 - 0.20 K/uL    nRBC 0 0 /100 WBC    Gran % 76.6 (H) 38.0 - 73.0 %    Lymph % 11.9 (L) 18.0 - 48.0 %    Mono % 8.8 4.0 - 15.0 %    Eosinophil % 0.2 0.0 - 8.0 %    Basophil % 0.2 0.0 - 1.9 %    Differential Method Automated    TSH    Collection Time: 10/28/20  5:23 AM   Result Value Ref Range    TSH 1.443 0.400 - 4.000 uIU/mL   Ammonia    Collection Time: 10/28/20  5:23 AM   Result Value Ref Range    Ammonia 47 10 - 50 umol/L        No results for input(s): POCTGLUCOSE in the last 168 hours.    No results found for: HGBA1C     Active Hospital Problems    Diagnosis  POA    *Pneumonia due to COVID-19 virus [U07.1, J12.89]  Yes      Resolved Hospital Problems   No resolved problems to display.        Overview:    Assessment and Plan for problems addressed today:   COVID-19 Virus Infection:  Viral Pneumonia due to COVID-19:  -Pt tested for COVID-19 and noted to be positive on 10/24  -Isolation: Airborne/Droplet. Surgical mask on patient. Notify Infection Control  -Management: per [facility] COVID Treatment Protocol (4/15/20)  -Monitoring: Telemetry & Continuous Pulse Oximetry  -Antibiotics: s/p levofloxacin 750 mg qd  -Nutrition:               -Continue Boost supplement              -Stopped Vitamin D as not deficient              -Ascorbic acid 500mg PO bid  -Supportive Care:              -Acetaminophen 650mg PO Q6hr PRN fever/headache              -Loperamide PRN viral diarrhea              -Robitussin, tessalon perls for cough   -Investigational Therapies:  -Atorvastatin 40mg po daily              -Due to hypoxia, pt started on dexamethasone 6mg PO daily up to 10 days or until pt is discharged from hospital (whichever is sooner)              -Started remdesivir 10/25.      Acute Hypoxemic Respiratory Failure  -RT consult via Respiratory Communication for COVID Protocols  -If wheezing, albuterol INH Q6h scheduled & PRN  -Incentive Spirometer Q4h  -Flutter Valve Q4h  -Continuous pulse oximetry; titrate oxygen to keep sats between 92-96%  -Wean off O2 as tolerated    -Supplemental O2 via LFNC, VentiMask, or HFNC (see Respiratory Support Oxygen Therapies)  -Proning Protocol if patient is a candidate with GCS >13 and able to self-prone (see  Proning Protocol)  -If deterioration, may warrant trial of NIPPV and transfer to [facility] pressure room or immediate ICU consult     Altered mental status  -suspect metabolic encephalopathy in setting of viral  sepsis  -may have some underlying dementia given son's description of poor awareness of limitations and previous neglect of his health  -TSH, ammonia wnl  -supplementing B12, folate    Presyncope with fall  -orthostatics negative  -unlikely heart failure related as BNP completely normal  -Appreciate PT/OT recs- home PT/OT     Macrocytic anemia  -c/w b12 supplementation. Oral in place of home monthly injection  -start folate supplement given borderline low level     HTN  -c/w home amlodipine     Chronic fatigue syndrome  -c/w home venlafaxine      Goals of care, counseling/discussion  -Reviewed the typical clinical course of COVID19 with patient, including the potential for acute decompensation requiring intubation and mechanical ventilation  -Pt currently maintains code status of full code.       VTE High Risk Prophylaxis: therapeutic lovenox    Discharge Planning   CHECO: 10/30/2020     Code Status: Full Code   Is the patient medically ready for discharge?: No    Reason for patient still in hospital (select all that apply): Patient unstable, Patient trending condition, Treatment and PT / OT recommendations  Discharge Plan A: Home with family, Home Health    Will likely need to go to [facility] care for a period of time while continues to improve. When requiring less supervision and no longer infectious then son, Wilfredo, is willing to help care for him. [facility] placement would be in [place] near the home of son, Wilfredo Wright.     Nathanael Hamilton MD  Hospital Medicine Staff  [phone] Pager

## 2020-10-28 NOTE — NURSING
"Pt AAO to self, VSS, afebrile, and remained on room air in no respiratory distress. Several attempts to leave room with personal belongings in hand, and repeatedly asking "how far is [place] from this place?" Frequent redirection needed and patient visibly frustrated. Refused all meds and refused to leave pulse ox, heart monitor, etc on for continuous monitoring. Bed is locked, in lowest position, side rails are raised x2, and call bell is within reach. Will continue to monitor.   "

## 2020-10-29 LAB
ALBUMIN SERPL BCP-MCNC: 2.9 G/DL (ref 3.5–5.2)
ALP SERPL-CCNC: 89 U/L (ref 55–135)
ALT SERPL W/O P-5'-P-CCNC: 87 U/L (ref 10–44)
ANION GAP SERPL CALC-SCNC: 7 MMOL/L (ref 8–16)
AST SERPL-CCNC: 61 U/L (ref 10–40)
BACTERIA BLD CULT: NORMAL
BACTERIA BLD CULT: NORMAL
BASOPHILS # BLD AUTO: 0.02 K/UL (ref 0–0.2)
BASOPHILS NFR BLD: 0.4 % (ref 0–1.9)
BILIRUB SERPL-MCNC: 0.7 MG/DL (ref 0.1–1)
BUN SERPL-MCNC: 25 MG/DL (ref 8–23)
CALCIUM SERPL-MCNC: 8.4 MG/DL (ref 8.7–10.5)
CHLORIDE SERPL-SCNC: 107 MMOL/L (ref 95–110)
CO2 SERPL-SCNC: 23 MMOL/L (ref 23–29)
CREAT SERPL-MCNC: 1.1 MG/DL (ref 0.5–1.4)
CRP SERPL-MCNC: 30.8 MG/L (ref 0–8.2)
D DIMER PPP IA.FEU-MCNC: 2.45 MG/L FEU
DIFFERENTIAL METHOD: ABNORMAL
EOSINOPHIL # BLD AUTO: 0 K/UL (ref 0–0.5)
EOSINOPHIL NFR BLD: 0 % (ref 0–8)
ERYTHROCYTE [DISTWIDTH] IN BLOOD BY AUTOMATED COUNT: 12.7 % (ref 11.5–14.5)
EST. GFR  (AFRICAN AMERICAN): >60 ML/MIN/1.73 M^2
EST. GFR  (NON AFRICAN AMERICAN): >60 ML/MIN/1.73 M^2
FERRITIN SERPL-MCNC: 396 NG/ML (ref 20–300)
GLUCOSE SERPL-MCNC: 140 MG/DL (ref 70–110)
HCT VFR BLD AUTO: 32.7 % (ref 40–54)
HGB BLD-MCNC: 11 G/DL (ref 14–18)
IMM GRANULOCYTES # BLD AUTO: 0.21 K/UL (ref 0–0.04)
IMM GRANULOCYTES NFR BLD AUTO: 4.6 % (ref 0–0.5)
LDH SERPL L TO P-CCNC: 394 U/L (ref 110–260)
LYMPHOCYTES # BLD AUTO: 0.4 K/UL (ref 1–4.8)
LYMPHOCYTES NFR BLD: 7.7 % (ref 18–48)
MAGNESIUM SERPL-MCNC: 2.2 MG/DL (ref 1.6–2.6)
MCH RBC QN AUTO: 33.2 PG (ref 27–31)
MCHC RBC AUTO-ENTMCNC: 33.6 G/DL (ref 32–36)
MCV RBC AUTO: 99 FL (ref 82–98)
MONOCYTES # BLD AUTO: 0.3 K/UL (ref 0.3–1)
MONOCYTES NFR BLD: 7 % (ref 4–15)
NEUTROPHILS # BLD AUTO: 3.7 K/UL (ref 1.8–7.7)
NEUTROPHILS NFR BLD: 80.3 % (ref 38–73)
NRBC BLD-RTO: 0 /100 WBC
PHOSPHATE SERPL-MCNC: 3.3 MG/DL (ref 2.7–4.5)
PLATELET # BLD AUTO: 226 K/UL (ref 150–350)
PMV BLD AUTO: 10.1 FL (ref 9.2–12.9)
POTASSIUM SERPL-SCNC: 4 MMOL/L (ref 3.5–5.1)
PROT SERPL-MCNC: 6.2 G/DL (ref 6–8.4)
RBC # BLD AUTO: 3.31 M/UL (ref 4.6–6.2)
SODIUM SERPL-SCNC: 137 MMOL/L (ref 136–145)
WBC # BLD AUTO: 4.57 K/UL (ref 3.9–12.7)

## 2020-10-29 PROCEDURE — 36415 COLL VENOUS BLD VENIPUNCTURE: CPT

## 2020-10-29 PROCEDURE — 99900035 HC TECH TIME PER 15 MIN (STAT)

## 2020-10-29 PROCEDURE — 99231 SBSQ HOSP IP/OBS SF/LOW 25: CPT | Mod: ,,, | Performed by: INTERNAL MEDICINE

## 2020-10-29 PROCEDURE — 94761 N-INVAS EAR/PLS OXIMETRY MLT: CPT

## 2020-10-29 PROCEDURE — 83735 ASSAY OF MAGNESIUM: CPT

## 2020-10-29 PROCEDURE — 97116 GAIT TRAINING THERAPY: CPT

## 2020-10-29 PROCEDURE — 27000221 HC OXYGEN, UP TO 24 HOURS

## 2020-10-29 PROCEDURE — 94640 AIRWAY INHALATION TREATMENT: CPT

## 2020-10-29 PROCEDURE — 20600001 HC STEP DOWN PRIVATE ROOM

## 2020-10-29 PROCEDURE — 63600175 PHARM REV CODE 636 W HCPCS: Performed by: STUDENT IN AN ORGANIZED HEALTH CARE EDUCATION/TRAINING PROGRAM

## 2020-10-29 PROCEDURE — 99231 PR SUBSEQUENT HOSPITAL CARE,LEVL I: ICD-10-PCS | Mod: ,,, | Performed by: INTERNAL MEDICINE

## 2020-10-29 PROCEDURE — 86140 C-REACTIVE PROTEIN: CPT

## 2020-10-29 PROCEDURE — 80053 COMPREHEN METABOLIC PANEL: CPT

## 2020-10-29 PROCEDURE — 83615 LACTATE (LD) (LDH) ENZYME: CPT

## 2020-10-29 PROCEDURE — 85379 FIBRIN DEGRADATION QUANT: CPT

## 2020-10-29 PROCEDURE — 97530 THERAPEUTIC ACTIVITIES: CPT

## 2020-10-29 PROCEDURE — 25000003 PHARM REV CODE 250: Performed by: STUDENT IN AN ORGANIZED HEALTH CARE EDUCATION/TRAINING PROGRAM

## 2020-10-29 PROCEDURE — 85025 COMPLETE CBC W/AUTO DIFF WBC: CPT

## 2020-10-29 PROCEDURE — 84100 ASSAY OF PHOSPHORUS: CPT

## 2020-10-29 PROCEDURE — 82728 ASSAY OF FERRITIN: CPT

## 2020-10-29 RX ADMIN — ALBUTEROL SULFATE 2 PUFF: 90 AEROSOL, METERED RESPIRATORY (INHALATION) at 08:10

## 2020-10-29 RX ADMIN — ENOXAPARIN SODIUM 80 MG: 80 INJECTION SUBCUTANEOUS at 11:10

## 2020-10-29 RX ADMIN — ALBUTEROL SULFATE 2 PUFF: 90 AEROSOL, METERED RESPIRATORY (INHALATION) at 01:10

## 2020-10-29 RX ADMIN — BENZONATATE 100 MG: 100 CAPSULE ORAL at 08:10

## 2020-10-29 RX ADMIN — ALBUTEROL SULFATE 2 PUFF: 90 AEROSOL, METERED RESPIRATORY (INHALATION) at 03:10

## 2020-10-29 RX ADMIN — ALBUTEROL SULFATE 2 PUFF: 90 AEROSOL, METERED RESPIRATORY (INHALATION) at 09:10

## 2020-10-29 RX ADMIN — ATORVASTATIN CALCIUM 40 MG: 20 TABLET, FILM COATED ORAL at 08:10

## 2020-10-29 RX ADMIN — FOLIC ACID 1 MG: 1 TABLET ORAL at 09:10

## 2020-10-29 RX ADMIN — OXYCODONE HYDROCHLORIDE AND ACETAMINOPHEN 500 MG: 500 TABLET ORAL at 08:10

## 2020-10-29 RX ADMIN — REMDESIVIR 100 MG: 100 INJECTION, POWDER, LYOPHILIZED, FOR SOLUTION INTRAVENOUS at 04:10

## 2020-10-29 RX ADMIN — IPRATROPIUM BROMIDE 2 PUFF: 17 AEROSOL, METERED RESPIRATORY (INHALATION) at 03:10

## 2020-10-29 RX ADMIN — MELATONIN TAB 3 MG 6 MG: 3 TAB at 08:10

## 2020-10-29 RX ADMIN — LACTOBACILLUS ACIDOPHILUS / LACTOBACILLUS BULGARICUS 1 EACH: 100 MILLION CFU STRENGTH GRANULES at 08:10

## 2020-10-29 RX ADMIN — OXYCODONE HYDROCHLORIDE AND ACETAMINOPHEN 500 MG: 500 TABLET ORAL at 09:10

## 2020-10-29 RX ADMIN — CYANOCOBALAMIN TAB 250 MCG 250 MCG: 250 TAB at 09:10

## 2020-10-29 RX ADMIN — IPRATROPIUM BROMIDE 2 PUFF: 17 AEROSOL, METERED RESPIRATORY (INHALATION) at 08:10

## 2020-10-29 RX ADMIN — AMLODIPINE BESYLATE 5 MG: 5 TABLET ORAL at 09:10

## 2020-10-29 RX ADMIN — VENLAFAXINE 37.5 MG: 37.5 TABLET ORAL at 08:10

## 2020-10-29 RX ADMIN — QUETIAPINE FUMARATE 25 MG: 25 TABLET ORAL at 08:10

## 2020-10-29 RX ADMIN — LACTOBACILLUS ACIDOPHILUS / LACTOBACILLUS BULGARICUS 1 EACH: 100 MILLION CFU STRENGTH GRANULES at 09:10

## 2020-10-29 RX ADMIN — IPRATROPIUM BROMIDE 2 PUFF: 17 AEROSOL, METERED RESPIRATORY (INHALATION) at 01:10

## 2020-10-29 RX ADMIN — VENLAFAXINE 37.5 MG: 37.5 TABLET ORAL at 09:10

## 2020-10-29 RX ADMIN — IPRATROPIUM BROMIDE 2 PUFF: 17 AEROSOL, METERED RESPIRATORY (INHALATION) at 09:10

## 2020-10-29 RX ADMIN — DEXAMETHASONE 6 MG: 4 TABLET ORAL at 09:10

## 2020-10-29 NOTE — PLAN OF CARE
Pt being followed by [facility] in  for possible admit upon d/c.     Kristen Bahena LMSW  Case Management Social Worker   [facility] [address]

## 2020-10-29 NOTE — PLAN OF CARE
This CM called and left message for son TRIXIE JENSEN [phone] to call back regarding possible discharge plan. Family is unable to provide 24/7 care if patient is still contagious due to work needs and having 14 yr old son in the home. Other son in [place] [employer] and is at higher risk with  all the people he is in contact with daily - he is also unable to take patient home. - still preferring a skilled placement due to confusion and need for supervision. Not interested in a long term NH situation. Has requested call from attending - Dr Sánchez notifed.    Nciolle Machuca, RN  Case Management  Ext [phone]

## 2020-10-29 NOTE — PLAN OF CARE
10/29/20 1446   Post-Acute Status   Post-Acute Authorization Placement   Post-Acute Placement Status Referrals Sent       SW sent SNF referrals to facilities in [place] area. Awaiting facility decision.     Will con't to follow.     Kristen Bahena [facility]  Case Management Social Worker   [facility]

## 2020-10-29 NOTE — PLAN OF CARE
Currently not stable for discharge - presently on 2LNC - REC is HH with 24/7 supervision v/s SNF. Family agreeable to SNF placement -  Remains confused (improving). will continue to follow.     Nicolle Machuca RN  Case Management  Ext [phone]       10/29/20 1415   Discharge Reassessment   Assessment Type Discharge Planning Reassessment   Provided patient/caregiver education on the expected discharge date and the discharge plan Yes   Do you have any problems affording any of your prescribed medications? No   Discharge Plan A Home;Home Health   DME Needed Upon Discharge  walker, rolling   Anticipated Discharge Disposition SNF   Can the patient/caregiver answer the patient profile reliably? No, cognitively impaired   Describe the patient's ability to walk at the present time. Walks with the help of equipment   Post-Acute Status   Post-Acute Authorization Placement   Post-Acute Placement Status Awaiting Internal Medical Clearance   Discharge Delays None known at this time

## 2020-10-29 NOTE — PROGRESS NOTES
Hospital Medicine   Progress note   Team: [facility]OSP MED R Branden Sánchez MD   Admit Date: 10/24/2020   CHECO 10/30/2020   Code status: Full Code   Principal Problem: Pneumonia due to COVID-19 virus     Hospital Course:  Mr Naty Hope is an 85-year-old m with a PMH chronic fatigue and HTN presented to [facility] via EMS for emergent evaluation of multiple near syncopal episodes and a fall.  Pt reports that he started to have symptoms including myalgias, cough, lightheadedness, fatigue starting 4 days ago. Was seen at an urgent care at that time. Was started on a course of azithromycin. The following day, about 3 days ago, he was called and told that the result of a SARSCOV2 test was positive. On the day of presentation he left his house for the first time in days, experienced near syncope and fell, scraping his right arm against a wall on the way down. A bystander called EMS. Pt reportedly incontinent of urine and stool en route. Pt denies loss of consciousness. Denies recent sick contacts. No fever, chills, nausea, diarrhea. Reports poor appetite over past several days. Has never had a similar event.      In the ED BP 86/69 T 99.1° HR 86 R 18 SpO2 82% (RA) GCS 15.  WBC 7.5 Hb 12.5 Na 134 Cr 1.6 BNP 27 LA 2.8 COVID pos.  CXR pos for infiltrate at the left base and probably at the right upper lung field. Patient given IVF 30 cc/kilogram and O2 4 L nasal catheter with improvement in BP to 120/58 and SpO2 to high 90s.  Patient also given dexamethasone 10 mg IV.  CTH showed no acute changes. Also, BC x 2. Patient started on levofloxacin.      Vital signs upon transfer arrival all normal except pt requiring 3 L NC. Started remdesivir 10/25. D-dimer elevated to 17 on 10/25. Started therapeutic lovenox. Stopped levaquin as no sufficient evidence of coinfection and concern for contribution to altered mental status. Started quetiapine for nighttime delirium.     Held long conversation with son on 10/27. Son  concerned about safety of family if pt returns home. Son does not want pt in son's home at this time as patient is infectious, and likely deconditioned. Also concerned that they will not be able to provide as much supervision as needed at this time. Family would like SNF. Overall improved, medically stable for SNF.     Interval hx:   No acute events overnight. HDS and afebrile. No pain or discomfort voiced today. On 1-2L NC PRN. Pt denies any current fever, chills, dyspnea. Appetite improving slowly. Dicussed with son POC. He is at baseline mentation currently. CM SW updated on SNF placement.         ROS   Constitutional: No fever. Weakness.   CV: No chest pain, edema, palpitations  Resp: SOB, cough. No sputum production  GI: No changes in appetite, NVDC, pain, melena, hematochezia, GERD, hematemesis  : No Dysuria, hematuria, urinary urgency, frequency  MSK: No arthralgia/myalgia, joint swelling  Neuro/Psych: Intermittent Confusion. No anxiety, depression    PEx   Temp:  [97.7 °F (36.5 °C)-97.8 °F (36.6 °C)]   Pulse:  [57-87]   Resp:  [17-32]   BP: (116-145)/(68-86)   SpO2:  [89 %-94 %]      I & O (Last 24H):     Intake/Output Summary (Last 24 hours) at 10/29/2020 1428  Last data filed at 10/28/2020 2100  Gross per 24 hour   Intake 470 ml   Output 500 ml   Net -30 ml       General appearance: Calm and cooperative   Mental status: Alert and Oriented to self and knows that he is in [place].   HEENT:  conjunctivae/corneas clear, PERRL  Neck: supple, thyroid not enlarged  Pulm:   normal respiratory effort. Coarse sounds over lower lung fields. Comfortable on 2L NC currently > 90.   Card: RRR, S1, S2 normal, no murmur, click, rub or gallop  Abd: soft, NT, ND, BS present; no masses, no organomegaly  Ext: no c/c/e. Missing parts of 2 digits on L hand.   Pulses: 2+, symmetric  Skin: color, texture, turgor normal. Abrasions over R forearm.   Neuro: Cranial Nerves grossly intact, no focal numbness or weakness, normal  strength and tone     Recent Results (from the past 24 hour(s))   C-Reactive Protein    Collection Time: 10/29/20 12:48 AM   Result Value Ref Range    CRP 30.8 (H) 0.0 - 8.2 mg/L   Lactate Dehydrogenase    Collection Time: 10/29/20 12:48 AM   Result Value Ref Range     (H) 110 - 260 U/L   Ferritin    Collection Time: 10/29/20 12:48 AM   Result Value Ref Range    Ferritin 396 (H) 20.0 - 300.0 ng/mL   D-Dimer, Quantitative    Collection Time: 10/29/20 12:48 AM   Result Value Ref Range    D-Dimer 2.45 (H) <0.50 mg/L FEU   Comprehensive Metabolic Panel (CMP)    Collection Time: 10/29/20  2:49 AM   Result Value Ref Range    Sodium 137 136 - 145 mmol/L    Potassium 4.0 3.5 - 5.1 mmol/L    Chloride 107 95 - 110 mmol/L    CO2 23 23 - 29 mmol/L    Glucose 140 (H) 70 - 110 mg/dL    BUN 25 (H) 8 - 23 mg/dL    Creatinine 1.1 0.5 - 1.4 mg/dL    Calcium 8.4 (L) 8.7 - 10.5 mg/dL    Total Protein 6.2 6.0 - 8.4 g/dL    Albumin 2.9 (L) 3.5 - 5.2 g/dL    Total Bilirubin 0.7 0.1 - 1.0 mg/dL    Alkaline Phosphatase 89 55 - 135 U/L    AST 61 (H) 10 - 40 U/L    ALT 87 (H) 10 - 44 U/L    Anion Gap 7 (L) 8 - 16 mmol/L    eGFR if African American >60.0 >60 mL/min/1.73 m^2    eGFR if non African American >60.0 >60 mL/min/1.73 m^2   Magnesium    Collection Time: 10/29/20  2:49 AM   Result Value Ref Range    Magnesium 2.2 1.6 - 2.6 mg/dL   Phosphorus    Collection Time: 10/29/20  2:49 AM   Result Value Ref Range    Phosphorus 3.3 2.7 - 4.5 mg/dL   CBC with Automated Differential    Collection Time: 10/29/20  2:49 AM   Result Value Ref Range    WBC 4.57 3.90 - 12.70 K/uL    RBC 3.31 (L) 4.60 - 6.20 M/uL    Hemoglobin 11.0 (L) 14.0 - 18.0 g/dL    Hematocrit 32.7 (L) 40.0 - 54.0 %    MCV 99 (H) 82 - 98 fL    MCH 33.2 (H) 27.0 - 31.0 pg    MCHC 33.6 32.0 - 36.0 g/dL    RDW 12.7 11.5 - 14.5 %    Platelets 226 150 - 350 K/uL    MPV 10.1 9.2 - 12.9 fL    Immature Granulocytes 4.6 (H) 0.0 - 0.5 %    Gran # (ANC) 3.7 1.8 - 7.7 K/uL    Immature  Grans (Abs) 0.21 (H) 0.00 - 0.04 K/uL    Lymph # 0.4 (L) 1.0 - 4.8 K/uL    Mono # 0.3 0.3 - 1.0 K/uL    Eos # 0.0 0.0 - 0.5 K/uL    Baso # 0.02 0.00 - 0.20 K/uL    nRBC 0 0 /100 WBC    Gran % 80.3 (H) 38.0 - 73.0 %    Lymph % 7.7 (L) 18.0 - 48.0 %    Mono % 7.0 4.0 - 15.0 %    Eosinophil % 0.0 0.0 - 8.0 %    Basophil % 0.4 0.0 - 1.9 %    Differential Method Automated        No results for input(s): POCTGLUCOSE in the last 168 hours.    No results found for: HGBA1C     Active Hospital Problems    Diagnosis  POA    *Pneumonia due to COVID-19 virus [U07.1, J12.89]  Yes      Resolved Hospital Problems   No resolved problems to display.        Overview:    Assessment and Plan for problems addressed today:     COVID-19 Virus Infection:  Viral Pneumonia due to COVID-19:  -Pt tested for COVID-19 and noted to be positive on 10/24  -Isolation: Airborne/Droplet. Surgical mask on patient. Notify Infection Control  -Management: per [facility] COVID Treatment Protocol (4/15/20)  -Monitoring: Telemetry & Continuous Pulse Oximetry  -Antibiotics: s/p levofloxacin 750 mg qd  -Nutrition:               -Continue Boost supplement              -Stopped Vitamin D as not deficient              -Ascorbic acid 500mg PO bid  -Supportive Care:              -Acetaminophen 650mg PO Q6hr PRN fever/headache              -Loperamide PRN viral diarrhea              -Robitussin, tessalon perls for cough   -Investigational Therapies:  -Atorvastatin 40mg po daily              -Due to hypoxia, pt started on dexamethasone 6mg PO daily up to 10 days or until pt is discharged from hospital (whichever is sooner)              -Started remdesivir 10/25.      Acute Hypoxemic Respiratory Failure  -RT consult via Respiratory Communication for COVID Protocols  -If wheezing, albuterol INH Q6h scheduled & PRN  -Incentive Spirometer Q4h  -Flutter Valve Q4h  -Continuous pulse oximetry; titrate oxygen to keep sats between 92-96%  -Wean off O2 as tolerated     -Supplemental O2 via LFNC, VentiMask, or HFNC (see Respiratory Support Oxygen Therapies)  -Proning Protocol if patient is a candidate with GCS >13 and able to self-prone (see  Proning Protocol)  -If deterioration, may warrant trial of NIPPV and transfer to neg pressure room or immediate ICU consult     Acute encephalopathy   -suspect metabolic encephalopathy in setting of viral sepsis  -may have some underlying dementia given son's description of poor awareness of limitations and previous neglect of his health  -TSH, ammonia wnl  -supplementing B12, folate    Presyncope with fall  -orthostatics negative  -unlikely heart failure related as BNP completely normal  -Appreciate PT/OT recs- home PT/OT     Macrocytic anemia  -c/w b12 supplementation. Oral in place of home monthly injection  -start folate supplement given borderline low level     HTN  -c/w home amlodipine     Chronic fatigue syndrome  -c/w home venlafaxine      Goals of care, counseling/discussion  -Reviewed the typical clinical course of COVID19 with patient, including the potential for acute decompensation requiring intubation and mechanical ventilation  -Pt currently maintains code status of full code.       VTE High Risk Prophylaxis: therapeutic lovenox for now    Discharge Planning   CHECO: 10/30/2020     Code Status: Full Code   Is the patient medically ready for discharge?: Yes    Reason for patient still in hospital (select all that apply): PT / OT recommendations  Discharge Plan A: Home, Home Health   Discharge Delays: None known at this time  Needs SNF on discharge, with return to home after SNF with 24/7 hr supervision     Branden Sánchez MD  Hospital Medicine Staff  [phone] Pager

## 2020-10-29 NOTE — PT/OT/SLP PROGRESS
Physical Therapy Treatment    Patient Name:  Naty Hope   MRN:  [id number]    Recommendations:     Discharge Recommendations:  (HH with 24/7 Supervision vs. Basic NH)   Discharge Equipment Recommendations: walker, rolling   Barriers to discharge: Decreased caregiver support and poor safety awareness     Assessment:     Naty Hope is a 85 y.o. male admitted with a medical diagnosis of Pneumonia due to COVID-19 virus.  He presents with the following impairments/functional limitations:  weakness, impaired endurance, impaired cognition, decreased coordination, impaired self care skills, impaired functional mobilty, decreased lower extremity function, gait instability, decreased safety awareness, impaired balance. Pt treated on this date focusing on bed mobility, gait training, and seated exercises. Pt performed all mobility on this date with SBA using a RW and required verbal cues to decrease fall risk. Pt oriented x4 following increased time for cognitive processing and demonstrated decreased impulsive behaviors compared to previous session although poor safety awareness still noted. At this time pt's primarily limitation is poor safety awareness. Pt has limited therapy needs at this time and will need 24/7 supervision following discharge to increase safety. If 24/7 assist is not available pt may require NH placement as his primary impairments are not therapy related. Pt is safe to continue mobility with NSG and would benefit from continued skilled acute PT 3x/wk to improve functional mobility.  Recommending pt receive PT services in HH setting following discharge from hospital once medically cleared.     Rehab Prognosis: Fair; patient would benefit from acute skilled PT services to address these deficits and reach maximum level of function.    Recent Surgery: * No surgery found *      Plan:     During this hospitalization, patient to be seen 3 x/week to address the identified rehab impairments via  gait training, therapeutic activities, therapeutic exercises, neuromuscular re-education and progress toward the following goals:    · Plan of Care Expires:  11/23/20    Subjective     Chief Complaint: none noted   Patient/Family Comments/goals: requested a razor to shave   Pain/Comfort:  · Pain Rating 1: 0/10      Objective:     Communicated with NSG prior to session.  Patient found supine with   upon PT entry to room.     General Precautions: Standard, airborne, contact, droplet, fall   Orthopedic Precautions:    Braces:       Functional Mobility:  · Bed Mobility:     · Rolling Left:  stand by assistance  · Scooting: stand by assistance  · Supine to Sit: stand by assistance  · Sit to Supine: stand by assistance  · Transfers:     · Sit to Stand:  stand by assistance with rolling walker  · Toilet Transfer: stand by assistance with  rolling walker  using  Step Transfer  · Gait: 18ft x2 CGA/SBA with RW demonstrating increased stability but occasional CGA still required d/t poor safety awareness.   · Balance: Sitting: SBA      Standing: SBA/CGA      AM-PAC 6 CLICK MOBILITY  Turning over in bed (including adjusting bedclothes, sheets and blankets)?: 3  Sitting down on and standing up from a chair with arms (e.g., wheelchair, bedside commode, etc.): 3  Moving from lying on back to sitting on the side of the bed?: 3  Moving to and from a bed to a chair (including a wheelchair)?: 3  Need to walk in hospital room?: 3  Climbing 3-5 steps with a railing?: 2  Basic Mobility Total Score: 17       Therapeutic Activities and Exercises:   - EOB Sitting: approx. 5mins SBA  - Static Standing: approx. 90secs SBA performing ADL tasks at the sink   - Seated Therapeutic Exercises    - Ankle Pumps: x20 each    - LAQ: x20 each   - Pt educated on: (no evidence of learning noted)   -PT roles, expectations, and POC    -Safety with mobility   -Benefits of OOB activities with NSG to increase strength and functional mobility    -Performing ther  ex for increasing LE ROM and strength   -Discharge recommendations    Patient left supine with all lines intact, call button in reach and bed alarm on..    GOALS:   Multidisciplinary Problems     Physical Therapy Goals        Problem: Physical Therapy Goal    Goal Priority Disciplines Outcome Goal Variances Interventions   Physical Therapy Goal     PT, PT/OT Ongoing, Progressing     Description: Goals to be met by: 20    Patient will increase functional independence with mobility by performin. Supine to sit with Modified [place] -not met  2. Sit to stand transfer with Modified [place] -not met  3. Gait  x 250 feet with Supervision maintaining oxygen saturations -not met  4. Pt perform AROM therapeutic exercises x 15 reps BLE in sitting in increased tolerance to activities and increase functional mobility- not met                     Time Tracking:     PT Received On: 10/29/20  PT Start Time: 1004     PT Stop Time: 1028  PT Total Time (min): 24 min     Billable Minutes: Gait Training 11 and Therapeutic Activity 13    Treatment Type: Treatment  PT/PTA: PT     PTA Visit Number: 0     Viktor Marcelino, PT  10/29/2020

## 2020-10-29 NOTE — PLAN OF CARE
Problem: Adult Inpatient Plan of Care  Goal: Plan of Care Review  Outcome: Ongoing, Progressing  Goal: Patient-Specific Goal (Individualization)  Outcome: Ongoing, Progressing  Goal: Absence of Hospital-Acquired Illness or Injury  Outcome: Ongoing, Progressing  Goal: Optimal Comfort and Wellbeing  Outcome: Ongoing, Progressing  Goal: Readiness for Transition of Care  Outcome: Ongoing, Progressing     Patient alert and oriented to self but intermittently disoriented to time, place and situation. Able to follow commands and move all extremities but has generalized weakness. Requires frequent redirection due to impulsivity. Tele sitter in place. No evidence of cardiopulmonary distress. Denies pain. Vital signs stable on 2-3L NC. Afebrile. Monitor shows patient in sinus rhythm but patient removed tele leads and refused to have leads replaced. Patient also refused Lovenox shot. Med Team R paged and notified of patient's refusal of care. Education provided on importance of telemetry monitoring and Lovenox but patient remained adamant about refusing. Free from falls. Call bell within reach. Rounding in place for comfort and safety. Will continue to monitor.

## 2020-10-29 NOTE — CONSULTS
[facility] - ICU [facility] Medicine  Telemedicine Consult Note    Patient Name: Naty Hope  MRN: [id number]  Admission Date: 10/24/2020  Hospital Length of Stay: 5 days  Attending Physician: Branden Sánchez MD   Primary Care Provider: Primary Doctor Chelo         Naty Hope has been accepted for transfer to [facility] and will be followed through telemedicine services beginning 10/30/20 at 7 AM.            Nuzhat Cerrato MD  Department of Hospital Medicine   [facility] - ICU [facility]

## 2020-10-30 PROBLEM — E78.5 HYPERLIPIDEMIA: Status: ACTIVE | Noted: 2020-10-30

## 2020-10-30 PROBLEM — F32.A DEPRESSION: Status: ACTIVE | Noted: 2020-10-30

## 2020-10-30 PROBLEM — I10 ESSENTIAL HYPERTENSION: Status: ACTIVE | Noted: 2020-10-30

## 2020-10-30 PROBLEM — F34.1 DYSTHYMIA: Status: ACTIVE | Noted: 2020-10-30

## 2020-10-30 PROBLEM — J96.01 ACUTE HYPOXEMIC RESPIRATORY FAILURE: Status: ACTIVE | Noted: 2020-10-30

## 2020-10-30 LAB
ALBUMIN SERPL BCP-MCNC: 2.9 G/DL (ref 3.5–5.2)
ALP SERPL-CCNC: 81 U/L (ref 55–135)
ALT SERPL W/O P-5'-P-CCNC: 87 U/L (ref 10–44)
ANION GAP SERPL CALC-SCNC: 11 MMOL/L (ref 8–16)
ANISOCYTOSIS BLD QL SMEAR: SLIGHT
AST SERPL-CCNC: 50 U/L (ref 10–40)
BASOPHILS # BLD AUTO: ABNORMAL K/UL (ref 0–0.2)
BASOPHILS NFR BLD: 0 % (ref 0–1.9)
BILIRUB SERPL-MCNC: 0.7 MG/DL (ref 0.1–1)
BUN SERPL-MCNC: 23 MG/DL (ref 8–23)
CALCIUM SERPL-MCNC: 8.4 MG/DL (ref 8.7–10.5)
CHLORIDE SERPL-SCNC: 110 MMOL/L (ref 95–110)
CO2 SERPL-SCNC: 20 MMOL/L (ref 23–29)
CREAT SERPL-MCNC: 1.1 MG/DL (ref 0.5–1.4)
CRP SERPL-MCNC: 14.4 MG/L (ref 0–8.2)
DIFFERENTIAL METHOD: ABNORMAL
EOSINOPHIL # BLD AUTO: ABNORMAL K/UL (ref 0–0.5)
EOSINOPHIL NFR BLD: 0 % (ref 0–8)
ERYTHROCYTE [DISTWIDTH] IN BLOOD BY AUTOMATED COUNT: 12.9 % (ref 11.5–14.5)
EST. GFR  (AFRICAN AMERICAN): >60 ML/MIN/1.73 M^2
EST. GFR  (NON AFRICAN AMERICAN): >60 ML/MIN/1.73 M^2
GLUCOSE SERPL-MCNC: 125 MG/DL (ref 70–110)
HCT VFR BLD AUTO: 36.3 % (ref 40–54)
HGB BLD-MCNC: 12.2 G/DL (ref 14–18)
IMM GRANULOCYTES # BLD AUTO: ABNORMAL K/UL (ref 0–0.04)
IMM GRANULOCYTES NFR BLD AUTO: ABNORMAL % (ref 0–0.5)
LYMPHOCYTES # BLD AUTO: ABNORMAL K/UL (ref 1–4.8)
LYMPHOCYTES NFR BLD: 9 % (ref 18–48)
MAGNESIUM SERPL-MCNC: 2.3 MG/DL (ref 1.6–2.6)
MCH RBC QN AUTO: 34.1 PG (ref 27–31)
MCHC RBC AUTO-ENTMCNC: 33.6 G/DL (ref 32–36)
MCV RBC AUTO: 101 FL (ref 82–98)
MONOCYTES # BLD AUTO: ABNORMAL K/UL (ref 0.3–1)
MONOCYTES NFR BLD: 1 % (ref 4–15)
MYELOCYTES NFR BLD MANUAL: 1 %
NEUTROPHILS NFR BLD: 87 % (ref 38–73)
NEUTS BAND NFR BLD MANUAL: 2 %
NRBC BLD-RTO: 0 /100 WBC
OVALOCYTES BLD QL SMEAR: ABNORMAL
PHOSPHATE SERPL-MCNC: 2.6 MG/DL (ref 2.7–4.5)
PLATELET # BLD AUTO: 215 K/UL (ref 150–350)
PLATELET BLD QL SMEAR: ABNORMAL
PMV BLD AUTO: 10.5 FL (ref 9.2–12.9)
POIKILOCYTOSIS BLD QL SMEAR: SLIGHT
POLYCHROMASIA BLD QL SMEAR: ABNORMAL
POTASSIUM SERPL-SCNC: 3.7 MMOL/L (ref 3.5–5.1)
PROT SERPL-MCNC: 6.2 G/DL (ref 6–8.4)
RBC # BLD AUTO: 3.58 M/UL (ref 4.6–6.2)
SMUDGE CELLS BLD QL SMEAR: PRESENT
SODIUM SERPL-SCNC: 141 MMOL/L (ref 136–145)
WBC # BLD AUTO: 6.37 K/UL (ref 3.9–12.7)

## 2020-10-30 PROCEDURE — 83735 ASSAY OF MAGNESIUM: CPT

## 2020-10-30 PROCEDURE — 25000242 PHARM REV CODE 250 ALT 637 W/ HCPCS: Performed by: INTERNAL MEDICINE

## 2020-10-30 PROCEDURE — 86140 C-REACTIVE PROTEIN: CPT

## 2020-10-30 PROCEDURE — 99233 PR SUBSEQUENT HOSPITAL CARE,LEVL III: ICD-10-PCS | Mod: 95,,, | Performed by: INTERNAL MEDICINE

## 2020-10-30 PROCEDURE — 83615 LACTATE (LD) (LDH) ENZYME: CPT

## 2020-10-30 PROCEDURE — 99233 SBSQ HOSP IP/OBS HIGH 50: CPT | Mod: 95,,, | Performed by: INTERNAL MEDICINE

## 2020-10-30 PROCEDURE — 27000221 HC OXYGEN, UP TO 24 HOURS

## 2020-10-30 PROCEDURE — 82728 ASSAY OF FERRITIN: CPT

## 2020-10-30 PROCEDURE — 85379 FIBRIN DEGRADATION QUANT: CPT

## 2020-10-30 PROCEDURE — 63600175 PHARM REV CODE 636 W HCPCS: Performed by: STUDENT IN AN ORGANIZED HEALTH CARE EDUCATION/TRAINING PROGRAM

## 2020-10-30 PROCEDURE — 27000646 HC AEROBIKA DEVICE

## 2020-10-30 PROCEDURE — 94761 N-INVAS EAR/PLS OXIMETRY MLT: CPT

## 2020-10-30 PROCEDURE — 85007 BL SMEAR W/DIFF WBC COUNT: CPT

## 2020-10-30 PROCEDURE — 25000003 PHARM REV CODE 250: Performed by: STUDENT IN AN ORGANIZED HEALTH CARE EDUCATION/TRAINING PROGRAM

## 2020-10-30 PROCEDURE — 94640 AIRWAY INHALATION TREATMENT: CPT

## 2020-10-30 PROCEDURE — 85027 COMPLETE CBC AUTOMATED: CPT

## 2020-10-30 PROCEDURE — 84100 ASSAY OF PHOSPHORUS: CPT

## 2020-10-30 PROCEDURE — 36415 COLL VENOUS BLD VENIPUNCTURE: CPT

## 2020-10-30 PROCEDURE — 99900035 HC TECH TIME PER 15 MIN (STAT)

## 2020-10-30 PROCEDURE — 94664 DEMO&/EVAL PT USE INHALER: CPT

## 2020-10-30 PROCEDURE — 94799 UNLISTED PULMONARY SVC/PX: CPT

## 2020-10-30 PROCEDURE — 20600001 HC STEP DOWN PRIVATE ROOM

## 2020-10-30 PROCEDURE — 80053 COMPREHEN METABOLIC PANEL: CPT

## 2020-10-30 RX ORDER — IPRATROPIUM BROMIDE AND ALBUTEROL SULFATE 2.5; .5 MG/3ML; MG/3ML
3 SOLUTION RESPIRATORY (INHALATION) EVERY 4 HOURS
Status: DISCONTINUED | OUTPATIENT
Start: 2020-10-30 | End: 2020-11-02 | Stop reason: HOSPADM

## 2020-10-30 RX ORDER — THIAMINE HCL 100 MG
100 TABLET ORAL DAILY
Status: DISCONTINUED | OUTPATIENT
Start: 2020-10-31 | End: 2020-11-02 | Stop reason: HOSPADM

## 2020-10-30 RX ADMIN — CYANOCOBALAMIN TAB 250 MCG 250 MCG: 250 TAB at 11:10

## 2020-10-30 RX ADMIN — OXYCODONE HYDROCHLORIDE AND ACETAMINOPHEN 500 MG: 500 TABLET ORAL at 08:10

## 2020-10-30 RX ADMIN — ENOXAPARIN SODIUM 80 MG: 80 INJECTION SUBCUTANEOUS at 10:10

## 2020-10-30 RX ADMIN — VENLAFAXINE 37.5 MG: 37.5 TABLET ORAL at 08:10

## 2020-10-30 RX ADMIN — ATORVASTATIN CALCIUM 40 MG: 20 TABLET, FILM COATED ORAL at 08:10

## 2020-10-30 RX ADMIN — ALBUTEROL SULFATE 2 PUFF: 90 AEROSOL, METERED RESPIRATORY (INHALATION) at 01:10

## 2020-10-30 RX ADMIN — LACTOBACILLUS ACIDOPHILUS / LACTOBACILLUS BULGARICUS 1 EACH: 100 MILLION CFU STRENGTH GRANULES at 08:10

## 2020-10-30 RX ADMIN — IPRATROPIUM BROMIDE AND ALBUTEROL SULFATE 3 ML: .5; 2.5 SOLUTION RESPIRATORY (INHALATION) at 12:10

## 2020-10-30 RX ADMIN — ALBUTEROL SULFATE 2 PUFF: 90 AEROSOL, METERED RESPIRATORY (INHALATION) at 08:10

## 2020-10-30 RX ADMIN — IPRATROPIUM BROMIDE 2 PUFF: 17 AEROSOL, METERED RESPIRATORY (INHALATION) at 01:10

## 2020-10-30 RX ADMIN — ENOXAPARIN SODIUM 80 MG: 80 INJECTION SUBCUTANEOUS at 11:10

## 2020-10-30 RX ADMIN — OXYCODONE HYDROCHLORIDE AND ACETAMINOPHEN 500 MG: 500 TABLET ORAL at 11:10

## 2020-10-30 RX ADMIN — VENLAFAXINE 37.5 MG: 37.5 TABLET ORAL at 09:10

## 2020-10-30 RX ADMIN — BENZONATATE 100 MG: 100 CAPSULE ORAL at 08:10

## 2020-10-30 RX ADMIN — AMLODIPINE BESYLATE 5 MG: 5 TABLET ORAL at 11:10

## 2020-10-30 RX ADMIN — DEXAMETHASONE 6 MG: 4 TABLET ORAL at 11:10

## 2020-10-30 RX ADMIN — IPRATROPIUM BROMIDE 2 PUFF: 17 AEROSOL, METERED RESPIRATORY (INHALATION) at 08:10

## 2020-10-30 RX ADMIN — LACTOBACILLUS ACIDOPHILUS / LACTOBACILLUS BULGARICUS 1 EACH: 100 MILLION CFU STRENGTH GRANULES at 09:10

## 2020-10-30 RX ADMIN — IPRATROPIUM BROMIDE AND ALBUTEROL SULFATE 3 ML: .5; 2.5 SOLUTION RESPIRATORY (INHALATION) at 08:10

## 2020-10-30 RX ADMIN — IPRATROPIUM BROMIDE AND ALBUTEROL SULFATE 3 ML: .5; 2.5 SOLUTION RESPIRATORY (INHALATION) at 04:10

## 2020-10-30 RX ADMIN — IPRATROPIUM BROMIDE AND ALBUTEROL SULFATE 3 ML: .5; 2.5 SOLUTION RESPIRATORY (INHALATION) at 11:10

## 2020-10-30 RX ADMIN — FOLIC ACID 1 MG: 1 TABLET ORAL at 11:10

## 2020-10-30 RX ADMIN — MELATONIN TAB 3 MG 6 MG: 3 TAB at 08:10

## 2020-10-30 NOTE — NURSING
Pt resting quietly in bed with eyes closed. NAD noted. Easily arousable to verbal stimuli. Awaiting MD orders / disposition. Bed rails up x 2 with bed locked in lowest position. Call light in reach. Will continue to monitor. ADMIT evaluation continues.

## 2020-10-30 NOTE — PROGRESS NOTES
[facility] - ICU [facility] Medicine  Telemedicine Progress Note    Patient Name: Naty Hope  MRN: [id number]  Patient Class: IP- Inpatient   Admission Date: 10/24/2020  Length of Stay: 6 days  Attending Physician: Nuzhat Cerrato MD  Primary Care Provider: Primary Doctor [facility] Medicine Team: [facility] VIRTUAL TEAM 10 Nuzhat Cerrato MD  Virtual Telemedicine Progress Note  Start time: 1538  Chief complaint: Pneumonia due to COVID-19 virus  The [facility] location is: [facility]  The patient arrived at: 10/24/2020  3:39 PM  Present with the patient at the time of the telemed/virtual assessment: n/a  End time:  1545  Total time spent with patient: 7 min  I have assessed findings virtually using a telemedicine platform and with assistance of the bedside nurse or telemedicine presenter.  The attending portion of this evaluation, treatment, and documentation was performed per Nuzhat Cerrato MD via audiovisual.    Patient was transferred to the telemedicine service on: 10/30/2020    Subjective:     Admission CC:   Chief Complaint   Patient presents with    Fatigue       multiple near syncopal episodes and [place]        Follow up visit for: Pneumonia due to COVID-19 virus    Interval History / Events Overnight:   The patient is able to provide adequate history. Additional history was obtained from past medical records and Chart review and nurse. Increased confusion / agitation requiring intervention.  Notified of patient's increasing confusion and hypoxia after exertion.  Mental status and respiratory status improved with transient increase in supplemental oxygen.  Patient complains of dyspnea. Symptoms have been unchanged since yesterday. Associated symptoms include: shortness of breath on exertion. Symptoms are decreasing in frequency. Alleviating factors include: oxygen and rest.  SpO2 93% on 2 L NC    Data reviewed 10/30/2020: Lab test(s)  reviewed: H&H stable      Review of Systems   Constitutional: Positive for fatigue. Negative for fever.   Respiratory: Positive for shortness of breath.    Neurological: Positive for light-headedness.   Psychiatric/Behavioral: Positive for confusion.     Objective:     Vital Signs (Most Recent):  Temp: 98.3 °F (36.8 °C) (10/30/20 1530)  Pulse: 72 (10/30/20 1634)  Resp: 16 (10/30/20 1634)  BP: 117/69 (10/30/20 1530)  SpO2: (!) 93 % (10/30/20 1634) Vital Signs (24h Range):  Temp:  [97.5 °F (36.4 °C)-98.3 °F (36.8 °C)] 98.3 °F (36.8 °C)  Pulse:  [54-84] 72  Resp:  [11-28] 16  SpO2:  [89 %-98 %] 93 %  BP: (116-131)/(65-80) 117/69     Weight: 79.8 kg (175 lb 14.8 oz)  Body mass index is 24.54 kg/m².    Intake/Output Summary (Last 24 hours) at 10/30/2020 1714  Last data filed at 10/30/2020 0400  Gross per 24 hour   Intake 350 ml   Output 150 ml   Net 200 ml      Physical Exam  Constitutional:       General: He is not in acute distress.     Appearance: Normal appearance. He is not diaphoretic.   Eyes:      General: Lids are normal. No scleral icterus.        Right eye: No discharge.         Left eye: No discharge.      Conjunctiva/sclera: Conjunctivae normal.   Cardiovascular:      Rate and Rhythm: Normal rate.   Pulmonary:      Effort: Pulmonary effort is normal. No tachypnea, accessory muscle usage or respiratory distress.   Abdominal:      General: There is no distension.   Skin:     Coloration: Skin is not cyanotic.   Neurological:      Mental Status: He is alert. He is not disoriented.   Psychiatric:         Attention and Perception: Attention normal.         Mood and Affect: Affect normal.         Speech: Speech normal.         Behavior: Behavior is cooperative.         Judgment: Judgment is impulsive.         Significant Labs:     Recent Labs   Lab 10/28/20  0523 10/29/20  0249 10/30/20  0941   WBC 6.05 4.57 6.37   HGB 11.1* 11.0* 12.2*   HCT 33.5* 32.7* 36.3*    226 215     Recent Labs   Lab 10/28/20  0571  10/29/20  0249 10/30/20  0941   GRAN 76.6*  4.6 80.3*  3.7 87.0*   BAND  --   --  2.0   LYMPH 11.9*  0.7* 7.7*  0.4* 9.0*  CANCELED   MONO 8.8  0.5 7.0  0.3 1.0*  CANCELED   EOS 0.0 0.0 CANCELED     Recent Labs   Lab 10/28/20  0523 10/29/20  0249 10/30/20  0941    137 141   K 3.8 4.0 3.7    107 110   CO2 24 23 20*   BUN 28* 25* 23   CREATININE 1.1 1.1 1.1   GLU 91 140* 125*   CALCIUM 8.6* 8.4* 8.4*   ALBUMIN 3.0* 2.9* 2.9*   MG 2.3 2.2 2.3   PHOS 2.5* 3.3 2.6*     Recent Labs   Lab 10/24/20  1025  10/28/20  0523 10/29/20  0249 10/30/20  0941   ALKPHOS 57   < > 83 89 81   ALT 67*   < > 76* 87* 87*   AST 86*   < > 60* 61* 50*   PROT 7.4   < > 6.0 6.2 6.2   BILITOT 1.2*   < > 0.7 0.7 0.7   INR 1.1  --   --   --   --     < > = values in this interval not displayed.     Procalcitonin (ng/mL)   Date Value   10/25/2020 0.07     Lactate (Lactic Acid) (mmol/L)   Date Value   10/24/2020 2.8 (H)   11/28/2013 4.0 (HH)     BNP (pg/mL)   Date Value   10/24/2020 27     CRP   Date Value   10/30/2020 14.4 mg/L (H)   10/29/2020 30.8 mg/L (H)   10/26/2020 29.6 mg/L (H)   10/25/2020 91.9 mg/L (H)   10/24/2020 9.21 mg/dL (H)     Sed Rate (mm/Hr)   Date Value   10/25/2020 42 (H)     D-Dimer (mg/L FEU)   Date Value   10/29/2020 2.45 (H)   10/26/2020 2.90 (H)   10/25/2020 17.12 (H)     Ferritin (ng/mL)   Date Value   10/29/2020 396 (H)   10/26/2020 490 (H)   10/25/2020 556 (H)   10/24/2020 574 (H)     LD (U/L)   Date Value   10/29/2020 394 (H)   10/26/2020 485 (H)   10/25/2020 454 (H)     Troponin I (ng/mL)   Date Value   10/24/2020 0.04   11/29/2013 <0.006   11/29/2013 0.008   11/28/2013 0.014     CPK (U/L)   Date Value   10/25/2020 132   10/24/2020 86   11/28/2013 46   11/28/2013 46     Results for orders placed or performed during the hospital encounter of 10/24/20   Vitamin D   Result Value Ref Range    Vit D, 25-Hydroxy 34 30 - 96 ng/mL     SARS-CoV-2 RNA, Amplification, Qual (no units)   Date Value   10/24/2020  Positive (A)       X-Ray Chest AP Portable  Narrative: EXAMINATION:  XR CHEST AP PORTABLE    CLINICAL HISTORY:  sob;    FINDINGS:  One view: Heart size is normal.  There are patchy infiltrates bilaterally.  This is worse in the right upper lobe compared to the prior study.  Impression: Worsening pulmonary infiltrates compared to 10/24/2020.  Possibilities include multifocal pneumonia, or asymmetric pulmonary edema.  Aspiration and sepsis less likely.    Electronically signed by: Romeo Rangel MD  Date:    10/30/2020  Time:    11:42        Assessment/Plan:      Active Diagnoses:    Diagnosis Date Noted POA    PRINCIPAL PROBLEM:  Pneumonia due to COVID-19 virus [U07.1, J12.89] 10/24/2020 Yes      Problems Resolved During this Admission:       Overview / ICU Course:    Naty Hope is a 85 y.o. male admitted for Pneumonia due to COVID-19 virus.    Inpatient Medications Prescribed for Management of Current Problems:     Scheduled Meds:    albuterol-ipratropium  3 mL Nebulization Q4H    amLODIPine  5 mg Oral Daily    ascorbic acid (vitamin C)  500 mg Oral BID    atorvastatin  40 mg Oral QHS    cyanocobalamin  250 mcg Oral Daily    dexAMETHasone  6 mg Oral Daily    enoxaparin  1 mg/kg Subcutaneous Q12H    folic acid  1 mg Oral Daily    lactobacillus acidophilus & bulgar  1 packet Oral BID    venlafaxine  37.5 mg Oral BID     Continuous Infusions:   As Needed: acetaminophen, benzonatate, dextrose 50%, dextrose 50%, glucagon (human recombinant), glucose, glucose, haloperidol lactate, loperamide, melatonin, ondansetron, polyethylene glycol, promethazine (PHENERGAN) IVPB, sodium chloride 0.9%    Assessment and Plan by Problem    COVID-19 Virus Infection:  Viral Pneumonia due to COVID-19:  -Pt tested for COVID-19 and noted to be positive on 10/24  -Isolation: Airborne/Droplet. Surgical mask on patient. Notify Infection Control  -Management: per [facility] COVID Treatment Protocol (4/15/20)  -Monitoring:  Telemetry & Continuous Pulse Oximetry  -Antibiotics: s/p levofloxacin 750 mg qd  -Nutrition:               -Continue Boost supplement              -Stopped Vitamin D as not deficient              -Ascorbic acid 500mg PO bid  -Supportive Care:              -Acetaminophen 650mg PO Q6hr PRN fever/headache              -Loperamide PRN viral diarrhea              -Robitussin, Tessalon Perles for cough   -Investigational Therapies:  -Atorvastatin 40mg po daily              -Due to hypoxia, pt started on dexamethasone 6mg PO daily up to 10 days or until pt is discharged from hospital (whichever is sooner)              -Started remdesivir 10/25.      Acute Hypoxemic Respiratory Failure  -RT consult via Respiratory Communication for COVID Protocols  -If wheezing, albuterol INH Q6h scheduled & PRN  -Incentive Spirometer Q4h  -Flutter Valve Q4h  -Continuous pulse oximetry; titrate oxygen to keep sats between 92-96%  -Wean off O2 as tolerated    -Supplemental O2 via LFNC, VentiMask, or HFNC (see Respiratory Support Oxygen Therapies)  -Proning Protocol if patient is a candidate with GCS >13 and able to self-prone (see  Proning Protocol)  -If deterioration, may warrant trial of NIPPV and transfer to neg pressure room or immediate ICU consult  -Wean O2     Acute encephalopathy   -suspect metabolic encephalopathy in setting of viral sepsis  -may have some underlying dementia given son's description of poor awareness of limitations and previous neglect of his health  -TSH, ammonia wnl  -supplementing B12, folate     Presyncope with fall at home  -orthostatics negative  -unlikely heart failure related as BNP completely normal  -Appreciate PT/OT recs - home PT/OT     Macrocytic anemia  -continue B12 supplementation. Oral in place of home monthly injection  -start folate supplement given borderline low level     HTN  -continue home amlodipine     Chronic fatigue syndrome, Dysthymia  -continue home venlafaxine     Diet: Diet Adult  Regular (IDDSI Level 7)  GI Prophylaxis: Not indicated  Significant LDAs:   IV Access Type: Peripheral  Urinary Catheter Indication if present: Patient Does Not Have Urinary Catheter  Other Lines/Tubes/Drains:    HIGH RISK CONDITION(S):   Patient has a condition that poses threat to life and bodily function: Severe Respiratory Distress     Goals of Care:    Previous admission:  10/24/20  Likely prognosis:  Fair  Code Status: Full Code  Comfort Only: No  Hospice: No  Goals at discharge: remain at home, with physician follow-up    Discharge Planning   CHECO: 10/30/2020     Code Status: Full Code   Is the patient medically ready for discharge?: Yes    Reason for patient still in hospital (select all that apply): Patient trending condition and Treatment  Discharge Plan A: Home, Home Health   Discharge Delays: None known at this time    VTE Risk Mitigation (From admission, onward)         Ordered     enoxaparin injection 80 mg  Every 12 hours (non-standard times)      10/26/20 1210     IP VTE HIGH RISK PATIENT  Once      10/24/20 1844     Place sequential compression device  Until discontinued      10/24/20 1844                   Nuzhat Cerrato MD  Department of Hospital Medicine   [facility] - ICU [facility]

## 2020-10-30 NOTE — NURSING
Assumed care of pt bed 96. Pt sitting up in bed and watching golf on television. Pt speaking about family and past travels. Pt able to state name, date of birth, and location. Pt unsure of current date/ time and full situation. Pt reports increase of fatigue, weakness, and tiredness. Pt reports falling but unable to provide time frame. Pt noted dry cough. Pt with generalized bruising and scab to RUE ext. Per report pt to be discharged to SNF. Pt reporting shortness of breath with exertion. Pt denies shortness of breath at rest.Dry cough. No sputum production noted at this time. Pt denies pain with breathing. +1 edema noted to bilateral upper and lower extremities. Lung sounds coarse/ diminished. Assessment done per flowsheet. NAD noted. Awaiting MD orders/ disposition. Bed rails up x  2 with bed locked in lowest position. Call light in reach. Will continue to monitor. AVASYS camera in room. Bed alarm on. Pt verbalizes understanding of plan of care. ADMIT evaluation continues.

## 2020-10-31 LAB
ALBUMIN SERPL BCP-MCNC: 3.1 G/DL (ref 3.5–5.2)
ALP SERPL-CCNC: 88 U/L (ref 55–135)
ALT SERPL W/O P-5'-P-CCNC: 73 U/L (ref 10–44)
ANION GAP SERPL CALC-SCNC: 7 MMOL/L (ref 8–16)
ANISOCYTOSIS BLD QL SMEAR: SLIGHT
AST SERPL-CCNC: 36 U/L (ref 10–40)
BASOPHILS # BLD AUTO: ABNORMAL K/UL (ref 0–0.2)
BASOPHILS NFR BLD: 0 % (ref 0–1.9)
BILIRUB SERPL-MCNC: 0.8 MG/DL (ref 0.1–1)
BUN SERPL-MCNC: 20 MG/DL (ref 8–23)
BURR CELLS BLD QL SMEAR: ABNORMAL
CALCIUM SERPL-MCNC: 8.4 MG/DL (ref 8.7–10.5)
CHLORIDE SERPL-SCNC: 104 MMOL/L (ref 95–110)
CO2 SERPL-SCNC: 27 MMOL/L (ref 23–29)
CREAT SERPL-MCNC: 1 MG/DL (ref 0.5–1.4)
D DIMER PPP IA.FEU-MCNC: 4.22 MG/L FEU
DIFFERENTIAL METHOD: ABNORMAL
EOSINOPHIL # BLD AUTO: ABNORMAL K/UL (ref 0–0.5)
EOSINOPHIL NFR BLD: 0 % (ref 0–8)
ERYTHROCYTE [DISTWIDTH] IN BLOOD BY AUTOMATED COUNT: 13.1 % (ref 11.5–14.5)
EST. GFR  (AFRICAN AMERICAN): >60 ML/MIN/1.73 M^2
EST. GFR  (NON AFRICAN AMERICAN): >60 ML/MIN/1.73 M^2
FERRITIN SERPL-MCNC: 304 NG/ML (ref 20–300)
GLUCOSE SERPL-MCNC: 122 MG/DL (ref 70–110)
HCT VFR BLD AUTO: 35.4 % (ref 40–54)
HGB BLD-MCNC: 11.7 G/DL (ref 14–18)
HYPOCHROMIA BLD QL SMEAR: ABNORMAL
IMM GRANULOCYTES # BLD AUTO: ABNORMAL K/UL (ref 0–0.04)
IMM GRANULOCYTES NFR BLD AUTO: ABNORMAL % (ref 0–0.5)
LDH SERPL L TO P-CCNC: 352 U/L (ref 110–260)
LYMPHOCYTES # BLD AUTO: ABNORMAL K/UL (ref 1–4.8)
LYMPHOCYTES NFR BLD: 2 % (ref 18–48)
MAGNESIUM SERPL-MCNC: 2.2 MG/DL (ref 1.6–2.6)
MCH RBC QN AUTO: 33.4 PG (ref 27–31)
MCHC RBC AUTO-ENTMCNC: 33.1 G/DL (ref 32–36)
MCV RBC AUTO: 101 FL (ref 82–98)
METAMYELOCYTES NFR BLD MANUAL: 2 %
MONOCYTES # BLD AUTO: ABNORMAL K/UL (ref 0.3–1)
MONOCYTES NFR BLD: 6 % (ref 4–15)
NEUTROPHILS NFR BLD: 90 % (ref 38–73)
NRBC BLD-RTO: 0 /100 WBC
PHOSPHATE SERPL-MCNC: 3.6 MG/DL (ref 2.7–4.5)
PLATELET # BLD AUTO: 233 K/UL (ref 150–350)
PLATELET BLD QL SMEAR: ABNORMAL
PMV BLD AUTO: 10.3 FL (ref 9.2–12.9)
POIKILOCYTOSIS BLD QL SMEAR: SLIGHT
POLYCHROMASIA BLD QL SMEAR: ABNORMAL
POTASSIUM SERPL-SCNC: 3.7 MMOL/L (ref 3.5–5.1)
PROT SERPL-MCNC: 6.3 G/DL (ref 6–8.4)
RBC # BLD AUTO: 3.5 M/UL (ref 4.6–6.2)
SODIUM SERPL-SCNC: 138 MMOL/L (ref 136–145)
WBC # BLD AUTO: 5.59 K/UL (ref 3.9–12.7)

## 2020-10-31 PROCEDURE — 83735 ASSAY OF MAGNESIUM: CPT

## 2020-10-31 PROCEDURE — 85007 BL SMEAR W/DIFF WBC COUNT: CPT

## 2020-10-31 PROCEDURE — 25000003 PHARM REV CODE 250: Performed by: STUDENT IN AN ORGANIZED HEALTH CARE EDUCATION/TRAINING PROGRAM

## 2020-10-31 PROCEDURE — 36415 COLL VENOUS BLD VENIPUNCTURE: CPT

## 2020-10-31 PROCEDURE — 80053 COMPREHEN METABOLIC PANEL: CPT

## 2020-10-31 PROCEDURE — 99233 PR SUBSEQUENT HOSPITAL CARE,LEVL III: ICD-10-PCS | Mod: 95,,, | Performed by: INTERNAL MEDICINE

## 2020-10-31 PROCEDURE — 99900035 HC TECH TIME PER 15 MIN (STAT)

## 2020-10-31 PROCEDURE — 63600175 PHARM REV CODE 636 W HCPCS: Performed by: STUDENT IN AN ORGANIZED HEALTH CARE EDUCATION/TRAINING PROGRAM

## 2020-10-31 PROCEDURE — 94799 UNLISTED PULMONARY SVC/PX: CPT

## 2020-10-31 PROCEDURE — 84425 ASSAY OF VITAMIN B-1: CPT

## 2020-10-31 PROCEDURE — 85027 COMPLETE CBC AUTOMATED: CPT

## 2020-10-31 PROCEDURE — 94761 N-INVAS EAR/PLS OXIMETRY MLT: CPT

## 2020-10-31 PROCEDURE — 25000242 PHARM REV CODE 250 ALT 637 W/ HCPCS: Performed by: INTERNAL MEDICINE

## 2020-10-31 PROCEDURE — 94664 DEMO&/EVAL PT USE INHALER: CPT

## 2020-10-31 PROCEDURE — 99233 SBSQ HOSP IP/OBS HIGH 50: CPT | Mod: 95,,, | Performed by: INTERNAL MEDICINE

## 2020-10-31 PROCEDURE — 25000003 PHARM REV CODE 250: Performed by: INTERNAL MEDICINE

## 2020-10-31 PROCEDURE — 84100 ASSAY OF PHOSPHORUS: CPT

## 2020-10-31 PROCEDURE — 20600001 HC STEP DOWN PRIVATE ROOM

## 2020-10-31 PROCEDURE — 94640 AIRWAY INHALATION TREATMENT: CPT

## 2020-10-31 PROCEDURE — 27000221 HC OXYGEN, UP TO 24 HOURS

## 2020-10-31 RX ADMIN — IPRATROPIUM BROMIDE AND ALBUTEROL SULFATE 3 ML: .5; 2.5 SOLUTION RESPIRATORY (INHALATION) at 01:10

## 2020-10-31 RX ADMIN — LACTOBACILLUS ACIDOPHILUS / LACTOBACILLUS BULGARICUS 1 EACH: 100 MILLION CFU STRENGTH GRANULES at 09:10

## 2020-10-31 RX ADMIN — VENLAFAXINE 37.5 MG: 37.5 TABLET ORAL at 09:10

## 2020-10-31 RX ADMIN — IPRATROPIUM BROMIDE AND ALBUTEROL SULFATE 3 ML: .5; 2.5 SOLUTION RESPIRATORY (INHALATION) at 08:10

## 2020-10-31 RX ADMIN — AMLODIPINE BESYLATE 5 MG: 5 TABLET ORAL at 09:10

## 2020-10-31 RX ADMIN — IPRATROPIUM BROMIDE AND ALBUTEROL SULFATE 3 ML: .5; 2.5 SOLUTION RESPIRATORY (INHALATION) at 04:10

## 2020-10-31 RX ADMIN — FOLIC ACID 1 MG: 1 TABLET ORAL at 09:10

## 2020-10-31 RX ADMIN — DEXAMETHASONE 6 MG: 4 TABLET ORAL at 09:10

## 2020-10-31 RX ADMIN — IPRATROPIUM BROMIDE AND ALBUTEROL SULFATE 3 ML: .5; 2.5 SOLUTION RESPIRATORY (INHALATION) at 03:10

## 2020-10-31 RX ADMIN — CYANOCOBALAMIN TAB 250 MCG 250 MCG: 250 TAB at 09:10

## 2020-10-31 RX ADMIN — ENOXAPARIN SODIUM 80 MG: 80 INJECTION SUBCUTANEOUS at 11:10

## 2020-10-31 RX ADMIN — OXYCODONE HYDROCHLORIDE AND ACETAMINOPHEN 500 MG: 500 TABLET ORAL at 09:10

## 2020-10-31 RX ADMIN — Medication 100 MG: at 09:10

## 2020-10-31 NOTE — PROGRESS NOTES
[facility] - ICU 15 [facility] Medicine  Telemedicine Progress Note    Patient Name: Naty Hope  MRN: [id number]  Patient Class: IP- Inpatient   Admission Date: 10/24/2020  Length of Stay: 7 days  Attending Physician: Nuzhat Cerrato MD  Primary Care Provider: Primary Doctor [facility] Medicine Team: [facility] VIRTUAL TEAM 10 Nuzhat Cerrato MD  Virtual Telemedicine Progress Note  Start time: 1455  Chief complaint: Pneumonia due to COVID-19 virus  The patient location is: [facility]  The patient arrived at: 10/24/2020  3:39 PM  Present with the patient at the time of the telemed/virtual assessment: n/a  End time:  1503  Total time spent with patient: 8 min  I have assessed findings virtually using a telemedicine platform and with assistance of the bedside nurse or telemedicine presenter.  The attending portion of this evaluation, treatment, and documentation was performed per Nuzhat Cerrato MD via audiovisual.    Patient was transferred to the telemedicine service on: 10/30/2020    Subjective:     Admission CC:   Chief Complaint   Patient presents with    Fatigue       multiple near syncopal episodes and falls [place]        Follow up visit for: Pneumonia due to COVID-19 virus    Interval History / Events Overnight:   The patient is able to provide adequate history. Additional history was obtained from relative(s) and Chart review and nurse. No significant events reported by Nursing.  Mental status but remains intermittently agitated about wanting to go home.  Son at bedside.   Patient complains of Nothing specific. Symptoms have improved since yesterday. Associated symptoms include: shortness of breath on exertion. Symptoms are decreasing in frequency. Alleviating factors include: oxygen and rest.  SpO2 91% on RA    Data reviewed 10/31/2020: Lab test(s) reviewed: H&H stable      Review of Systems   Constitutional: Positive for fatigue. Negative for  fever.   Respiratory: Negative for shortness of breath.      Objective:     Vital Signs (Most Recent):  Temp: 96.9 °F (36.1 °C) (10/31/20 1130)  Pulse: 91 (10/31/20 1604)  Resp: (!) 22 (10/31/20 1604)  BP: (!) 155/77 (10/31/20 1100)  SpO2: (!) 91 % (10/31/20 1604) Vital Signs (24h Range):  Temp:  [96.9 °F (36.1 °C)-98.6 °F (37 °C)] 96.9 °F (36.1 °C)  Pulse:  [67-91] 91  Resp:  [14-33] 22  SpO2:  [91 %-95 %] 91 %  BP: (118-155)/(70-77) 155/77     Weight: 79.8 kg (175 lb 14.8 oz)  Body mass index is 24.54 kg/m².    Intake/Output Summary (Last 24 hours) at 10/31/2020 1704  Last data filed at 10/31/2020 0800  Gross per 24 hour   Intake 320 ml   Output 750 ml   Net -430 ml      Physical Exam  Constitutional:       General: He is not in acute distress.     Appearance: Normal appearance. He is not diaphoretic.   Eyes:      General: Lids are normal. No scleral icterus.        Right eye: No discharge.         Left eye: No discharge.      Conjunctiva/sclera: Conjunctivae normal.   Cardiovascular:      Rate and Rhythm: Normal rate.   Pulmonary:      Effort: Pulmonary effort is normal. Tachypnea present. No accessory muscle usage or respiratory distress.   Abdominal:      General: There is no distension.   Skin:     Coloration: Skin is not cyanotic.   Neurological:      Mental Status: He is alert. Mental status is at baseline. He is not disoriented.   Psychiatric:         Attention and Perception: Attention normal.         Mood and Affect: Affect normal.         Speech: Speech normal.         Behavior: Behavior is cooperative.         Judgment: Judgment is impulsive.         Significant Labs:     Recent Labs   Lab 10/29/20  0249 10/30/20  0941 10/31/20  0405   WBC 4.57 6.37 5.59   HGB 11.0* 12.2* 11.7*   HCT 32.7* 36.3* 35.4*    215 233     Recent Labs   Lab 10/29/20  0249 10/30/20  0941 10/31/20  0405   GRAN 80.3*  3.7 87.0* 90.0*   BAND  --  2.0  --    LYMPH 7.7*  0.4* 9.0*  CANCELED 2.0*  CANCELED   MONO 7.0  0.3  1.0*  CANCELED 6.0  CANCELED   EOS 0.0 CANCELED CANCELED     Recent Labs   Lab 10/29/20  0249 10/30/20  0941 10/31/20  0405    141 138   K 4.0 3.7 3.7    110 104   CO2 23 20* 27   BUN 25* 23 20   CREATININE 1.1 1.1 1.0   * 125* 122*   CALCIUM 8.4* 8.4* 8.4*   ALBUMIN 2.9* 2.9* 3.1*   MG 2.2 2.3 2.2   PHOS 3.3 2.6* 3.6     Recent Labs   Lab 10/29/20  0249 10/30/20  0941 10/31/20  0405   ALKPHOS 89 81 88   ALT 87* 87* 73*   AST 61* 50* 36   PROT 6.2 6.2 6.3   BILITOT 0.7 0.7 0.8     Procalcitonin (ng/mL)   Date Value   10/25/2020 0.07     Lactate (Lactic Acid) (mmol/L)   Date Value   10/24/2020 2.8 (H)   11/28/2013 4.0 (HH)     BNP (pg/mL)   Date Value   10/24/2020 27     CRP   Date Value   10/30/2020 14.4 mg/L (H)   10/29/2020 30.8 mg/L (H)   10/26/2020 29.6 mg/L (H)   10/25/2020 91.9 mg/L (H)   10/24/2020 9.21 mg/dL (H)     Sed Rate (mm/Hr)   Date Value   10/25/2020 42 (H)     D-Dimer (mg/L FEU)   Date Value   10/30/2020 4.22 (H)   10/29/2020 2.45 (H)   10/26/2020 2.90 (H)   10/25/2020 17.12 (H)     Ferritin (ng/mL)   Date Value   10/30/2020 304 (H)   10/29/2020 396 (H)   10/26/2020 490 (H)   10/25/2020 556 (H)   10/24/2020 574 (H)     LD (U/L)   Date Value   10/30/2020 352 (H)   10/29/2020 394 (H)   10/26/2020 485 (H)   10/25/2020 454 (H)     Troponin I (ng/mL)   Date Value   10/24/2020 0.04   11/29/2013 <0.006   11/29/2013 0.008   11/28/2013 0.014     CPK (U/L)   Date Value   10/25/2020 132   10/24/2020 86   11/28/2013 46   11/28/2013 46     Results for orders placed or performed during the hospital encounter of 10/24/20   Vitamin D   Result Value Ref Range    Vit D, 25-Hydroxy 34 30 - 96 ng/mL     SARS-CoV-2 RNA, Amplification, Qual (no units)   Date Value   10/24/2020 Positive (A)       X-Ray Chest AP Portable  Narrative: EXAMINATION:  XR CHEST AP PORTABLE    CLINICAL HISTORY:  sob;    FINDINGS:  One view: Heart size is normal.  There are patchy infiltrates bilaterally.  This is worse in the  right upper lobe compared to the prior study.  Impression: Worsening pulmonary infiltrates compared to 10/24/2020.  Possibilities include multifocal pneumonia, or asymmetric pulmonary edema.  Aspiration and sepsis less likely.    Electronically signed by: Romeo Rangel MD  Date:    10/30/2020  Time:    11:42        Assessment/Plan:      Active Diagnoses:    Diagnosis Date Noted POA    PRINCIPAL PROBLEM:  Pneumonia due to COVID-19 virus [U07.1, J12.89] 10/24/2020 Yes    Dysthymia [F34.1] 10/30/2020 Yes    Hyperlipidemia [E78.5] 10/30/2020 Yes    Acute hypoxemic respiratory failure [J96.01] 10/30/2020 Yes    Essential hypertension [I10] 10/30/2020 Yes      Problems Resolved During this Admission:       Overview / ICU Course:    Naty Hope is a 85 y.o. male admitted for Pneumonia due to COVID-19 virus.    Inpatient Medications Prescribed for Management of Current Problems:     Scheduled Meds:    albuterol-ipratropium  3 mL Nebulization Q4H    amLODIPine  5 mg Oral Daily    ascorbic acid (vitamin C)  500 mg Oral BID    atorvastatin  40 mg Oral QHS    cyanocobalamin  250 mcg Oral Daily    dexAMETHasone  6 mg Oral Daily    enoxaparin  1 mg/kg Subcutaneous Q12H    folic acid  1 mg Oral Daily    lactobacillus acidophilus & bulgar  1 packet Oral BID    thiamine  100 mg Oral Daily    venlafaxine  37.5 mg Oral BID     Continuous Infusions:   As Needed: acetaminophen, benzonatate, dextrose 50%, dextrose 50%, glucagon (human recombinant), glucose, glucose, haloperidol lactate, loperamide, melatonin, ondansetron, polyethylene glycol, promethazine (PHENERGAN) IVPB, sodium chloride 0.9%    Assessment and Plan by Problem    COVID-19 Virus Infection:  Viral Pneumonia due to COVID-19:  -Pt tested for COVID-19 and noted to be positive on 10/24  -Isolation: Airborne/Droplet. Surgical mask on patient. Notify Infection Control  -Management: per [facility] COVID Treatment Protocol (4/15/20)  -Monitoring: Telemetry  & Continuous Pulse Oximetry  -Antibiotics: s/p levofloxacin 750 mg qd  -Nutrition:               -Continue Boost supplement              -Stopped Vitamin D as not deficient              -Ascorbic acid 500mg PO bid  -Supportive Care:              -Acetaminophen 650mg PO Q6hr PRN fever/headache              -Loperamide PRN viral diarrhea              -Robitussin, Tesclint Ales for cough   -Investigational Therapies:  -Atorvastatin 40mg po daily              -Due to hypoxia, pt started on dexamethasone 6mg PO daily up to 10 days or until pt is discharged from hospital (whichever is sooner)              -Started remdesivir 10/25.  Completed.     Acute Hypoxemic Respiratory Failure  -RT consult via Respiratory Communication for COVID Protocols  -If wheezing, albuterol INH Q6h scheduled & PRN  -Incentive Spirometer Q4h  -Flutter Valve Q4h  -Continuous pulse oximetry; titrate oxygen to keep sats between 92-96%  -Wean off O2 as tolerated    -Supplemental O2 via LFNC, VentiMask, or HFNC (see Respiratory Support Oxygen Therapies)  -Proning Protocol if patient is a candidate with GCS >13 and able to self-prone (see HM Proning Protocol)  -If deterioration, may warrant trial of NIPPV and transfer to [facility] pressure room or immediate ICU consult  -Wean O2.  Walk test ordered.     Acute encephalopathy   -suspect metabolic encephalopathy in setting of viral sepsis due to COVID-19.  -may have some underlying dementia given son's description of poor awareness of limitations and previous neglect of his health  -TSH, ammonia wnl  -supplementing B12, folate.  -thiamine level ordered and pending.  Oral thiamine supplementation started.  Per son on 10/31, patient is very near baseline.  However, patient is impulsive behavior led to his exposure to the novel coronavirus.  Patient will need 247 supervision throughout the rest of his isolation which ends 11/13.     Presyncope with fall at home  -orthostatics negative  -unlikely heart failure  related as BNP completely normal  -Appreciate PT/OT recs - continue PT/OT     Macrocytic anemia  -continue B12 supplementation. Oral in place of home monthly injection  -start folate supplement given borderline low level     HTN  -continue home amlodipine     Chronic fatigue syndrome, Dysthymia  -continue home venlafaxine     Diet: Diet Adult Regular (IDDSI Level 7)  GI Prophylaxis: Not indicated  Significant LDAs:   IV Access Type: Peripheral  Urinary Catheter Indication if present: Patient Does Not Have Urinary Catheter  Other Lines/Tubes/Drains:    HIGH RISK CONDITION(S):   Patient has a condition that poses threat to life and bodily function: Severe Respiratory Distress     Goals of Care:    Previous admission:  10/24/20  Likely prognosis:  Fair  Code Status: Full Code  Comfort Only: No  Hospice: No  Goals at discharge: remain at home, with physician follow-up    Discharge Planning   CHECO: 10/30/2020     Code Status: Full Code   Is the patient medically ready for discharge?: Yes    Reason for patient still in hospital (select all that apply): Patient trending condition and Treatment  Discharge Plan A: Home, Home Health - plan for discharge to [facility]  on 11/3.  Discharge Delays: None known at this time    VTE Risk Mitigation (From admission, onward)         Ordered     enoxaparin injection 80 mg  Every 12 hours (non-standard times)      10/26/20 1210     IP VTE HIGH RISK PATIENT  Once      10/24/20 1844     Place sequential compression device  Until discontinued      10/24/20 1844                   Nuzhat Cerrato MD  Department of Hospital Medicine   [facility] - ICU 15 [facility]

## 2020-10-31 NOTE — NURSING
Pt resting quietly in bed with eyes closed. Pt able to turn and reposition self. NAD noted. Easily arousable to verbal stimuli. Pt offers no complaints at present. Awaiting MD orders/ disposition. Bed rails up x 2 with bed locked in lowest position. Call light in reach. Will continue to monitor. ADMIT evaluation continues.

## 2020-10-31 NOTE — PLAN OF CARE
10/31/2020      Naty Hope  [address] [place] [address]          Hospital Medicine Dept.  [facility] [address] [place] [address]  [phone] after hours  [phone] fax       Naty Hope has been hospitalized at the [facility] since 10/24/2020.      Please excuse the patient from duties.      Patient may not drive until seen and cleared to drive by Primary Care Physician.        Nuzhat Cerrato MD  10/31/2020

## 2020-11-01 LAB
ALBUMIN SERPL BCP-MCNC: 2.9 G/DL (ref 3.5–5.2)
ALP SERPL-CCNC: 80 U/L (ref 55–135)
ALT SERPL W/O P-5'-P-CCNC: 60 U/L (ref 10–44)
ANION GAP SERPL CALC-SCNC: 10 MMOL/L (ref 8–16)
AST SERPL-CCNC: 28 U/L (ref 10–40)
BASOPHILS NFR BLD: 0 % (ref 0–1.9)
BILIRUB SERPL-MCNC: 0.6 MG/DL (ref 0.1–1)
BUN SERPL-MCNC: 24 MG/DL (ref 8–23)
CALCIUM SERPL-MCNC: 9.1 MG/DL (ref 8.7–10.5)
CHLORIDE SERPL-SCNC: 106 MMOL/L (ref 95–110)
CO2 SERPL-SCNC: 24 MMOL/L (ref 23–29)
CREAT SERPL-MCNC: 1 MG/DL (ref 0.5–1.4)
DIFFERENTIAL METHOD: ABNORMAL
EOSINOPHIL NFR BLD: 0 % (ref 0–8)
ERYTHROCYTE [DISTWIDTH] IN BLOOD BY AUTOMATED COUNT: 13.2 % (ref 11.5–14.5)
EST. GFR  (AFRICAN AMERICAN): >60 ML/MIN/1.73 M^2
EST. GFR  (NON AFRICAN AMERICAN): >60 ML/MIN/1.73 M^2
GLUCOSE SERPL-MCNC: 110 MG/DL (ref 70–110)
HCT VFR BLD AUTO: 35 % (ref 40–54)
HGB BLD-MCNC: 11.7 G/DL (ref 14–18)
IMM GRANULOCYTES # BLD AUTO: ABNORMAL K/UL (ref 0–0.04)
IMM GRANULOCYTES NFR BLD AUTO: ABNORMAL % (ref 0–0.5)
LYMPHOCYTES NFR BLD: 2 % (ref 18–48)
MAGNESIUM SERPL-MCNC: 2.3 MG/DL (ref 1.6–2.6)
MCH RBC QN AUTO: 33.8 PG (ref 27–31)
MCHC RBC AUTO-ENTMCNC: 33.4 G/DL (ref 32–36)
MCV RBC AUTO: 101 FL (ref 82–98)
METAMYELOCYTES NFR BLD MANUAL: 1 %
MONOCYTES NFR BLD: 4 % (ref 4–15)
MYELOCYTES NFR BLD MANUAL: 1 %
NEUTROPHILS NFR BLD: 88 % (ref 38–73)
NEUTS BAND NFR BLD MANUAL: 4 %
NRBC BLD-RTO: 0 /100 WBC
PHOSPHATE SERPL-MCNC: 3.4 MG/DL (ref 2.7–4.5)
PLATELET # BLD AUTO: 224 K/UL (ref 150–350)
PLATELET BLD QL SMEAR: ABNORMAL
PMV BLD AUTO: 10.9 FL (ref 9.2–12.9)
POTASSIUM SERPL-SCNC: 4.4 MMOL/L (ref 3.5–5.1)
PROT SERPL-MCNC: 6.4 G/DL (ref 6–8.4)
RBC # BLD AUTO: 3.46 M/UL (ref 4.6–6.2)
S PNEUM AG UR QL: NOT DETECTED
SODIUM SERPL-SCNC: 140 MMOL/L (ref 136–145)
WBC # BLD AUTO: 7.38 K/UL (ref 3.9–12.7)

## 2020-11-01 PROCEDURE — 80053 COMPREHEN METABOLIC PANEL: CPT

## 2020-11-01 PROCEDURE — 94640 AIRWAY INHALATION TREATMENT: CPT

## 2020-11-01 PROCEDURE — 85027 COMPLETE CBC AUTOMATED: CPT

## 2020-11-01 PROCEDURE — 83735 ASSAY OF MAGNESIUM: CPT

## 2020-11-01 PROCEDURE — 85007 BL SMEAR W/DIFF WBC COUNT: CPT

## 2020-11-01 PROCEDURE — 99233 PR SUBSEQUENT HOSPITAL CARE,LEVL III: ICD-10-PCS | Mod: 95,,, | Performed by: INTERNAL MEDICINE

## 2020-11-01 PROCEDURE — 99900035 HC TECH TIME PER 15 MIN (STAT)

## 2020-11-01 PROCEDURE — 94664 DEMO&/EVAL PT USE INHALER: CPT

## 2020-11-01 PROCEDURE — 25000242 PHARM REV CODE 250 ALT 637 W/ HCPCS: Performed by: INTERNAL MEDICINE

## 2020-11-01 PROCEDURE — 36415 COLL VENOUS BLD VENIPUNCTURE: CPT

## 2020-11-01 PROCEDURE — 99233 SBSQ HOSP IP/OBS HIGH 50: CPT | Mod: 95,,, | Performed by: INTERNAL MEDICINE

## 2020-11-01 PROCEDURE — 25000003 PHARM REV CODE 250: Performed by: STUDENT IN AN ORGANIZED HEALTH CARE EDUCATION/TRAINING PROGRAM

## 2020-11-01 PROCEDURE — 94761 N-INVAS EAR/PLS OXIMETRY MLT: CPT

## 2020-11-01 PROCEDURE — 84100 ASSAY OF PHOSPHORUS: CPT

## 2020-11-01 PROCEDURE — 20600001 HC STEP DOWN PRIVATE ROOM

## 2020-11-01 PROCEDURE — 25000003 PHARM REV CODE 250: Performed by: INTERNAL MEDICINE

## 2020-11-01 PROCEDURE — 63600175 PHARM REV CODE 636 W HCPCS: Performed by: STUDENT IN AN ORGANIZED HEALTH CARE EDUCATION/TRAINING PROGRAM

## 2020-11-01 RX ADMIN — IPRATROPIUM BROMIDE AND ALBUTEROL SULFATE 3 ML: .5; 2.5 SOLUTION RESPIRATORY (INHALATION) at 10:11

## 2020-11-01 RX ADMIN — LACTOBACILLUS ACIDOPHILUS / LACTOBACILLUS BULGARICUS 1 EACH: 100 MILLION CFU STRENGTH GRANULES at 08:11

## 2020-11-01 RX ADMIN — ATORVASTATIN CALCIUM 40 MG: 20 TABLET, FILM COATED ORAL at 08:11

## 2020-11-01 RX ADMIN — IPRATROPIUM BROMIDE AND ALBUTEROL SULFATE 3 ML: .5; 2.5 SOLUTION RESPIRATORY (INHALATION) at 01:11

## 2020-11-01 RX ADMIN — IPRATROPIUM BROMIDE AND ALBUTEROL SULFATE 3 ML: .5; 2.5 SOLUTION RESPIRATORY (INHALATION) at 11:11

## 2020-11-01 RX ADMIN — AMLODIPINE BESYLATE 5 MG: 5 TABLET ORAL at 09:11

## 2020-11-01 RX ADMIN — MELATONIN TAB 3 MG 6 MG: 3 TAB at 08:11

## 2020-11-01 RX ADMIN — ENOXAPARIN SODIUM 80 MG: 80 INJECTION SUBCUTANEOUS at 12:11

## 2020-11-01 RX ADMIN — IPRATROPIUM BROMIDE AND ALBUTEROL SULFATE 3 ML: .5; 2.5 SOLUTION RESPIRATORY (INHALATION) at 04:11

## 2020-11-01 RX ADMIN — IPRATROPIUM BROMIDE AND ALBUTEROL SULFATE 3 ML: .5; 2.5 SOLUTION RESPIRATORY (INHALATION) at 12:11

## 2020-11-01 RX ADMIN — BENZONATATE 100 MG: 100 CAPSULE ORAL at 08:11

## 2020-11-01 RX ADMIN — CYANOCOBALAMIN TAB 250 MCG 250 MCG: 250 TAB at 09:11

## 2020-11-01 RX ADMIN — LACTOBACILLUS ACIDOPHILUS / LACTOBACILLUS BULGARICUS 1 EACH: 100 MILLION CFU STRENGTH GRANULES at 09:11

## 2020-11-01 RX ADMIN — DEXAMETHASONE 6 MG: 4 TABLET ORAL at 09:11

## 2020-11-01 RX ADMIN — ENOXAPARIN SODIUM 80 MG: 80 INJECTION SUBCUTANEOUS at 08:11

## 2020-11-01 RX ADMIN — IPRATROPIUM BROMIDE AND ALBUTEROL SULFATE 3 ML: .5; 2.5 SOLUTION RESPIRATORY (INHALATION) at 07:11

## 2020-11-01 RX ADMIN — Medication 100 MG: at 09:11

## 2020-11-01 RX ADMIN — OXYCODONE HYDROCHLORIDE AND ACETAMINOPHEN 500 MG: 500 TABLET ORAL at 08:11

## 2020-11-01 RX ADMIN — VENLAFAXINE 37.5 MG: 37.5 TABLET ORAL at 09:11

## 2020-11-01 RX ADMIN — FOLIC ACID 1 MG: 1 TABLET ORAL at 09:11

## 2020-11-01 RX ADMIN — OXYCODONE HYDROCHLORIDE AND ACETAMINOPHEN 500 MG: 500 TABLET ORAL at 09:11

## 2020-11-01 RX ADMIN — VENLAFAXINE 37.5 MG: 37.5 TABLET ORAL at 08:11

## 2020-11-01 NOTE — NURSING
"Assumed care of pt bed 96. Pt sitting in bed with home clothing on. Pt states " I just wanted to get out of the gown for a bit. " Pt assisted to undress. Pt ambulated to restroom with steady gait and without assistance. Pt took sitting shower at this time. Pt provided with fresh gown at this time. Pt able to place gown on without assistance. Pt denies shortness of breath with exertion. No cough noted at this time. Pt denies fever/chills. Pt denies pain with breathing. No edema noted. Pts lung sounds diminished. Awaiting MD orders/ disposition. Bed rails up x 2 with bed locked in lowest position. Call light in reach. Will continue to monitor. ADMIT evaluation continues.   "

## 2020-11-01 NOTE — NURSING
"Pt refusing vital signs at this time. Pt states " It's not you. It's not you at all. I just don't think I need this done. I'm trying to get some sleep. How can I get better. You. The girls outside. Someone is always in here."   "

## 2020-11-01 NOTE — NURSING
"Pt frustrated at this time. Pt states " I don't want to be here anymore. It's too bright. No one can turn the rizvi light out and it shines in my room. I asked to walk around outside. Well I can't go outside. I'm tired of this. I'm ready to go. I was dressed and ready to go. I don't want to stay here until Tuesday. Take all of this off." Pt taking monitoring equipment off of self. Pt refusing to have pulse ox, cardiac leads, or BP cuff on.   "

## 2020-11-01 NOTE — NURSING
"Pt sitting up in bed and continues to state " I am frustrated. I don't feel like the people that have come in and out of my room all night are trained. "  "

## 2020-11-01 NOTE — PROGRESS NOTES
[facility] - ICU 15 [facility] Medicine  Telemedicine Progress Note    Patient Name: Naty Hope  MRN: [id number]  Patient Class: IP- Inpatient   Admission Date: 10/24/2020  Length of Stay: 8 days  Attending Physician: Nuzhat Cerrato MD  Primary Care Provider: [facility] Medicine Team: [facility] [place] 10 Nuzhat Cerrato MD  Virtual Telemedicine Progress Note  Start time: 1629  Chief complaint: Pneumonia due to COVID-19 virus  The [facility] location is: [facility]  The patient arrived at: 10/24/2020  3:39 PM  Present with the patient at the time of the telemed/virtual assessment: n/a  End time:  1635  Total time spent with patient: 6 min  I have assessed findings virtually using a telemedicine platform and with assistance of the bedside nurse or telemedicine presenter.  The attending portion of this evaluation, treatment, and documentation was performed per Nuzhat Cerrato MD via audiovisual.    Patient was transferred to the telemedicine service on: 10/30/2020    Subjective:     Admission CC:   Chief Complaint   Patient presents with    Fatigue       multiple near syncopal episodes and falls [place]        Follow up visit for: Pneumonia due to COVID-19 virus    Interval History / Events Overnight:   The patient is able to provide adequate history. Additional history was obtained from Chart review and nurse. No significant events reported by Nursing.  Mental status at baseline.  Patient complains of Nothing specific. Symptoms have improved since yesterday. Associated symptoms include: shortness of breath on exertion. Symptoms are decreasing in frequency. Alleviating factors include: oxygen and rest.  SpO2 93% on RA    Data reviewed 11/1/2020: Lab test(s) reviewed: H&H stable      Review of Systems   Constitutional: Positive for fatigue. Negative for fever.   Respiratory: Positive for cough. Negative for shortness of breath.       Objective:     Vital Signs (Most Recent):  Temp: 97.4 °F (36.3 °C) (11/01/20 1200)  Pulse: 79 (11/01/20 1323)  Resp: 16 (11/01/20 1323)  BP: 131/79 (11/01/20 1200)  SpO2: (!) 93 % (11/01/20 1620) Vital Signs (24h Range):  Temp:  [96.8 °F (36 °C)-98.4 °F (36.9 °C)] 97.4 °F (36.3 °C)  Pulse:  [72-99] 79  Resp:  [16-20] 16  SpO2:  [90 %-96 %] 93 %  BP: (120-131)/(69-79) 131/79     Weight: 79.8 kg (175 lb 14.8 oz)  Body mass index is 24.54 kg/m².    Intake/Output Summary (Last 24 hours) at 11/1/2020 1620  Last data filed at 11/1/2020 1200  Gross per 24 hour   Intake 300 ml   Output 200 ml   Net 100 ml      Physical Exam  Constitutional:       General: He is not in acute distress.     Appearance: Normal appearance. He is not diaphoretic.   Eyes:      General: Lids are normal. No scleral icterus.        Right eye: No discharge.         Left eye: No discharge.      Conjunctiva/sclera: Conjunctivae normal.   Cardiovascular:      Rate and Rhythm: Normal rate.   Pulmonary:      Effort: Pulmonary effort is normal. No tachypnea, accessory muscle usage or respiratory distress.   Abdominal:      General: There is no distension.   Skin:     Coloration: Skin is not cyanotic.   Neurological:      Mental Status: He is alert. Mental status is at baseline. He is not disoriented.   Psychiatric:         Attention and Perception: Attention normal.         Mood and Affect: Affect normal.         Speech: Speech normal.         Behavior: Behavior is cooperative.         Judgment: Judgment is impulsive.         Significant Labs:     Recent Labs   Lab 10/30/20  0941 10/31/20  0405 11/01/20  0436   WBC 6.37 5.59 7.38   HGB 12.2* 11.7* 11.7*   HCT 36.3* 35.4* 35.0*    233 224     Recent Labs   Lab 10/29/20  0249 10/30/20  0941 10/31/20  0405 11/01/20 0436   GRAN 80.3*  3.7 87.0* 90.0* 88.0*   BAND  --  2.0  --  4.0   LYMPH 7.7*  0.4* 9.0*  CANCELED 2.0*  CANCELED 2.0*   MONO 7.0  0.3 1.0*  CANCELED 6.0  CANCELED 4.0   EOS 0.0  CANCELED CANCELED  --      Recent Labs   Lab 10/30/20  0941 10/31/20  0405 11/01/20  0436    138 140   K 3.7 3.7 4.4    104 106   CO2 20* 27 24   BUN 23 20 24*   CREATININE 1.1 1.0 1.0   * 122* 110   CALCIUM 8.4* 8.4* 9.1   ALBUMIN 2.9* 3.1* 2.9*   MG 2.3 2.2 2.3   PHOS 2.6* 3.6 3.4     Recent Labs   Lab 10/30/20  0941 10/31/20  0405 11/01/20  0436   ALKPHOS 81 88 80   ALT 87* 73* 60*   AST 50* 36 28   PROT 6.2 6.3 6.4   BILITOT 0.7 0.8 0.6     Procalcitonin (ng/mL)   Date Value   10/25/2020 0.07     Lactate (Lactic Acid) (mmol/L)   Date Value   10/24/2020 2.8 (H)   11/28/2013 4.0 (HH)     BNP (pg/mL)   Date Value   10/24/2020 27     CRP   Date Value   10/30/2020 14.4 mg/L (H)   10/29/2020 30.8 mg/L (H)   10/26/2020 29.6 mg/L (H)   10/25/2020 91.9 mg/L (H)   10/24/2020 9.21 mg/dL (H)     Sed Rate (mm/Hr)   Date Value   10/25/2020 42 (H)     D-Dimer (mg/L FEU)   Date Value   10/30/2020 4.22 (H)   10/29/2020 2.45 (H)   10/26/2020 2.90 (H)   10/25/2020 17.12 (H)     Ferritin (ng/mL)   Date Value   10/30/2020 304 (H)   10/29/2020 396 (H)   10/26/2020 490 (H)   10/25/2020 556 (H)   10/24/2020 574 (H)     LD (U/L)   Date Value   10/30/2020 352 (H)   10/29/2020 394 (H)   10/26/2020 485 (H)   10/25/2020 454 (H)     Troponin I (ng/mL)   Date Value   10/24/2020 0.04   11/29/2013 <0.006   11/29/2013 0.008   11/28/2013 0.014     CPK (U/L)   Date Value   10/25/2020 132   10/24/2020 86   11/28/2013 46   11/28/2013 46     Results for orders placed or performed during the hospital encounter of 10/24/20   Vitamin D   Result Value Ref Range    Vit D, 25-Hydroxy 34 30 - 96 ng/mL     SARS-CoV-2 RNA, Amplification, Qual (no units)   Date Value   10/24/2020 Positive (A)       X-Ray Chest AP Portable  Narrative: EXAMINATION:  XR CHEST AP PORTABLE    CLINICAL HISTORY:  sob;    FINDINGS:  One view: Heart size is normal.  There are patchy infiltrates bilaterally.  This is worse in the right upper lobe compared to the prior  study.  Impression: Worsening pulmonary infiltrates compared to 10/24/2020.  Possibilities include multifocal pneumonia, or asymmetric pulmonary edema.  Aspiration and sepsis less likely.    Electronically signed by: Romeo Rangel MD  Date:    10/30/2020  Time:    11:42        Assessment/Plan:      Active Diagnoses:    Diagnosis Date Noted POA    PRINCIPAL PROBLEM:  Pneumonia due to COVID-19 virus [U07.1, J12.89] 10/24/2020 Yes    Dysthymia [F34.1] 10/30/2020 Yes    Hyperlipidemia [E78.5] 10/30/2020 Yes    Acute hypoxemic respiratory failure [J96.01] 10/30/2020 Yes    Essential hypertension [I10] 10/30/2020 Yes      Problems Resolved During this Admission:       Overview / ICU Course:    Naty Hope is a 85 y.o. male admitted for Pneumonia due to COVID-19 virus.    Inpatient Medications Prescribed for Management of Current Problems:     Scheduled Meds:    albuterol-ipratropium  3 mL Nebulization Q4H    amLODIPine  5 mg Oral Daily    ascorbic acid (vitamin C)  500 mg Oral BID    atorvastatin  40 mg Oral QHS    cyanocobalamin  250 mcg Oral Daily    dexAMETHasone  6 mg Oral Daily    enoxaparin  1 mg/kg Subcutaneous Q12H    folic acid  1 mg Oral Daily    lactobacillus acidophilus & bulgar  1 packet Oral BID    thiamine  100 mg Oral Daily    venlafaxine  37.5 mg Oral BID     Continuous Infusions:   As Needed: acetaminophen, benzonatate, dextrose 50%, dextrose 50%, glucagon (human recombinant), glucose, glucose, haloperidol lactate, loperamide, melatonin, ondansetron, polyethylene glycol, promethazine (PHENERGAN) IVPB, sodium chloride 0.9%    Assessment and Plan by Problem    COVID-19 Virus Infection:  Viral Pneumonia due to COVID-19:  -Pt tested for COVID-19 and noted to be positive on 10/24  -Isolation: Airborne/Droplet. Surgical mask on patient. Notify Infection Control  -Management: per [facility] COVID Treatment Protocol (4/15/20)  -Monitoring: Telemetry & Continuous Pulse  Oximetry  -Antibiotics: s/p levofloxacin 750 mg qd  -Nutrition:               -Continue Boost supplement              -Stopped Vitamin D as not deficient              -Ascorbic acid 500mg PO bid  -Supportive Care:              -Acetaminophen 650mg PO Q6hr PRN fever/headache              -Loperamide PRN viral diarrhea              -Robitussin, Tessalon Perles for cough   -Investigational Therapies:  -Atorvastatin 40mg po daily              -Due to hypoxia, pt started on dexamethasone 6mg PO daily up to 10 days or until pt is discharged from hospital (whichever is sooner)              -Started remdesivir 10/25.  Completed.   -Lovenox for significantly elevated D-dimer; now improved, discontinuing Lovenox.     Acute Hypoxemic Respiratory Failure  -RT consult via Respiratory Communication for COVID Protocols  -If wheezing, albuterol INH Q6h scheduled & PRN  -Incentive Spirometer Q4h  -Flutter Valve Q4h  -Continuous pulse oximetry; titrate oxygen to keep sats between 92-96%  -Wean off O2 as tolerated    -Supplemental O2 via LFNC, VentiMask, or HFNC (see Respiratory Support Oxygen Therapies)  -Proning Protocol if patient is a candidate with GCS >13 and able to self-prone (see HM Proning Protocol)  -If deterioration, may warrant trial of NIPPV and transfer to [facility] pressure room or immediate ICU consult  -Wean O2.  Walk test ordered.     Acute encephalopathy   -suspect metabolic encephalopathy in setting of viral sepsis due to COVID-19.  -may have some underlying dementia given son's description of poor awareness of limitations and previous neglect of his health  -TSH, ammonia wnl  -supplementing B12, folate.  -thiamine level ordered and pending.  Oral thiamine supplementation started.  Per son on 10/31, patient is very near baseline.  However, patient is impulsive behavior led to his exposure to the novel coronavirus.  Patient will need 247 supervision throughout the rest of his isolation which ends 11/13.     Presyncope  with fall at home  -orthostatics negative  -unlikely heart failure related as BNP completely normal  -Appreciate PT/OT recs - continue PT/OT     Macrocytic anemia  -continue B12 supplementation. Oral in place of home monthly injection  -start folate supplement given borderline low level     HTN  -continue home amlodipine     Chronic fatigue syndrome, Dysthymia  -continue home venlafaxine     Diet: Diet Adult Regular (IDDSI Level 7)  GI Prophylaxis: Not indicated  Significant LDAs:   IV Access Type: Peripheral  Urinary Catheter Indication if present: Patient Does Not Have Urinary Catheter  Other Lines/Tubes/Drains:    HIGH RISK CONDITION(S):   Patient has a condition that poses threat to life and bodily function: Severe Respiratory Distress     Goals of Care:    Previous admission:  10/24/20  Likely prognosis:  Fair  Code Status: Full Code  Comfort Only: No  Hospice: No  Goals at discharge: remain at home, with physician follow-up    Discharge Planning   CHECO: 11/3/2020     Code Status: Full Code   Is the patient medically ready for discharge?: Yes    Reason for patient still in hospital (select all that apply): Patient trending condition and Treatment  Discharge Plan A: Home, Home Health - plan for discharge to [facility] in  on 11/3.  Discharge Delays: None known at this time    VTE Risk Mitigation (From admission, onward)         Ordered     enoxaparin injection 80 mg  Every 12 hours (non-standard times)      10/26/20 1210     IP VTE HIGH RISK PATIENT  Once      10/24/20 1844     Place sequential compression device  Until discontinued      10/24/20 1844                   Nuzhat Cerrato MD  Department of Hospital Medicine   [facility] - ICU 15 WT

## 2020-11-01 NOTE — NURSING
"Pt ambulating in room to restroom at this time. Attempted to assist pt/ Pt states " Everyone thinks I can't do anything. This is why I am frustrated. I can't get out of bed to do anything alone and I can't even go look out of the window alone."   "

## 2020-11-02 VITALS
RESPIRATION RATE: 20 BRPM | WEIGHT: 175.94 LBS | TEMPERATURE: 99 F | HEIGHT: 71 IN | OXYGEN SATURATION: 96 % | DIASTOLIC BLOOD PRESSURE: 64 MMHG | SYSTOLIC BLOOD PRESSURE: 113 MMHG | BODY MASS INDEX: 24.63 KG/M2 | HEART RATE: 73 BPM

## 2020-11-02 PROBLEM — J96.01 ACUTE HYPOXEMIC RESPIRATORY FAILURE: Status: RESOLVED | Noted: 2020-10-30 | Resolved: 2020-11-02

## 2020-11-02 LAB
ALBUMIN SERPL BCP-MCNC: 3 G/DL (ref 3.5–5.2)
ALP SERPL-CCNC: 84 U/L (ref 55–135)
ALT SERPL W/O P-5'-P-CCNC: 65 U/L (ref 10–44)
ANION GAP SERPL CALC-SCNC: 8 MMOL/L (ref 8–16)
ANISOCYTOSIS BLD QL SMEAR: SLIGHT
AST SERPL-CCNC: 33 U/L (ref 10–40)
BASOPHILS # BLD AUTO: ABNORMAL K/UL (ref 0–0.2)
BASOPHILS NFR BLD: 0 % (ref 0–1.9)
BILIRUB SERPL-MCNC: 0.6 MG/DL (ref 0.1–1)
BUN SERPL-MCNC: 27 MG/DL (ref 8–23)
CALCIUM SERPL-MCNC: 9.7 MG/DL (ref 8.7–10.5)
CHLORIDE SERPL-SCNC: 104 MMOL/L (ref 95–110)
CO2 SERPL-SCNC: 26 MMOL/L (ref 23–29)
CREAT SERPL-MCNC: 1 MG/DL (ref 0.5–1.4)
D DIMER PPP IA.FEU-MCNC: 1.76 MG/L FEU
DIFFERENTIAL METHOD: ABNORMAL
EOSINOPHIL # BLD AUTO: ABNORMAL K/UL (ref 0–0.5)
EOSINOPHIL NFR BLD: 0 % (ref 0–8)
ERYTHROCYTE [DISTWIDTH] IN BLOOD BY AUTOMATED COUNT: 13.2 % (ref 11.5–14.5)
EST. GFR  (AFRICAN AMERICAN): >60 ML/MIN/1.73 M^2
EST. GFR  (NON AFRICAN AMERICAN): >60 ML/MIN/1.73 M^2
FERRITIN SERPL-MCNC: 271 NG/ML (ref 20–300)
GLUCOSE SERPL-MCNC: 127 MG/DL (ref 70–110)
HCT VFR BLD AUTO: 35.9 % (ref 40–54)
HGB BLD-MCNC: 11.8 G/DL (ref 14–18)
HYPOCHROMIA BLD QL SMEAR: ABNORMAL
IMM GRANULOCYTES # BLD AUTO: ABNORMAL K/UL (ref 0–0.04)
IMM GRANULOCYTES NFR BLD AUTO: ABNORMAL % (ref 0–0.5)
LDH SERPL L TO P-CCNC: 351 U/L (ref 110–260)
LYMPHOCYTES # BLD AUTO: ABNORMAL K/UL (ref 1–4.8)
LYMPHOCYTES NFR BLD: 5 % (ref 18–48)
MAGNESIUM SERPL-MCNC: 2.3 MG/DL (ref 1.6–2.6)
MCH RBC QN AUTO: 33.2 PG (ref 27–31)
MCHC RBC AUTO-ENTMCNC: 32.9 G/DL (ref 32–36)
MCV RBC AUTO: 101 FL (ref 82–98)
METAMYELOCYTES NFR BLD MANUAL: 1 %
MONOCYTES # BLD AUTO: ABNORMAL K/UL (ref 0.3–1)
MONOCYTES NFR BLD: 5 % (ref 4–15)
NEUTROPHILS NFR BLD: 89 % (ref 38–73)
NRBC BLD-RTO: 0 /100 WBC
PHOSPHATE SERPL-MCNC: 3.7 MG/DL (ref 2.7–4.5)
PLATELET # BLD AUTO: 267 K/UL (ref 150–350)
PLATELET BLD QL SMEAR: ABNORMAL
PMV BLD AUTO: 10.7 FL (ref 9.2–12.9)
POLYCHROMASIA BLD QL SMEAR: ABNORMAL
POTASSIUM SERPL-SCNC: 4.2 MMOL/L (ref 3.5–5.1)
PROT SERPL-MCNC: 6.5 G/DL (ref 6–8.4)
RBC # BLD AUTO: 3.55 M/UL (ref 4.6–6.2)
SODIUM SERPL-SCNC: 138 MMOL/L (ref 136–145)
WBC # BLD AUTO: 8.68 K/UL (ref 3.9–12.7)

## 2020-11-02 PROCEDURE — 82728 ASSAY OF FERRITIN: CPT

## 2020-11-02 PROCEDURE — 36415 COLL VENOUS BLD VENIPUNCTURE: CPT

## 2020-11-02 PROCEDURE — 25000242 PHARM REV CODE 250 ALT 637 W/ HCPCS: Performed by: INTERNAL MEDICINE

## 2020-11-02 PROCEDURE — 97116 GAIT TRAINING THERAPY: CPT

## 2020-11-02 PROCEDURE — 25000003 PHARM REV CODE 250: Performed by: STUDENT IN AN ORGANIZED HEALTH CARE EDUCATION/TRAINING PROGRAM

## 2020-11-02 PROCEDURE — 94640 AIRWAY INHALATION TREATMENT: CPT

## 2020-11-02 PROCEDURE — 99239 HOSP IP/OBS DSCHRG MGMT >30: CPT | Mod: 95,CR,, | Performed by: INTERNAL MEDICINE

## 2020-11-02 PROCEDURE — 83735 ASSAY OF MAGNESIUM: CPT

## 2020-11-02 PROCEDURE — 25000003 PHARM REV CODE 250: Performed by: INTERNAL MEDICINE

## 2020-11-02 PROCEDURE — 84100 ASSAY OF PHOSPHORUS: CPT

## 2020-11-02 PROCEDURE — 94761 N-INVAS EAR/PLS OXIMETRY MLT: CPT

## 2020-11-02 PROCEDURE — 85379 FIBRIN DEGRADATION QUANT: CPT

## 2020-11-02 PROCEDURE — 63600175 PHARM REV CODE 636 W HCPCS: Performed by: STUDENT IN AN ORGANIZED HEALTH CARE EDUCATION/TRAINING PROGRAM

## 2020-11-02 PROCEDURE — 99900035 HC TECH TIME PER 15 MIN (STAT)

## 2020-11-02 PROCEDURE — 85007 BL SMEAR W/DIFF WBC COUNT: CPT

## 2020-11-02 PROCEDURE — 80053 COMPREHEN METABOLIC PANEL: CPT

## 2020-11-02 PROCEDURE — 99239 PR HOSPITAL DISCHARGE DAY,>30 MIN: ICD-10-PCS | Mod: 95,CR,, | Performed by: INTERNAL MEDICINE

## 2020-11-02 PROCEDURE — 83615 LACTATE (LD) (LDH) ENZYME: CPT

## 2020-11-02 PROCEDURE — 85027 COMPLETE CBC AUTOMATED: CPT

## 2020-11-02 PROCEDURE — 94664 DEMO&/EVAL PT USE INHALER: CPT

## 2020-11-02 RX ORDER — FOLIC ACID 1 MG/1
1 TABLET ORAL DAILY
Refills: 0
Start: 2020-11-03 | End: 2021-11-03

## 2020-11-02 RX ORDER — ASCORBIC ACID 500 MG
250 TABLET ORAL DAILY
COMMUNITY
Start: 2020-11-02

## 2020-11-02 RX ORDER — CYANOCOBALAMIN (VITAMIN B-12) 250 MCG
250 TABLET ORAL DAILY
Start: 2020-11-03 | End: 2020-11-02 | Stop reason: HOSPADM

## 2020-11-02 RX ORDER — LANOLIN ALCOHOL/MO/W.PET/CERES
100 CREAM (GRAM) TOPICAL DAILY
Start: 2020-11-03

## 2020-11-02 RX ADMIN — Medication 100 MG: at 08:11

## 2020-11-02 RX ADMIN — VENLAFAXINE 37.5 MG: 37.5 TABLET ORAL at 08:11

## 2020-11-02 RX ADMIN — LACTOBACILLUS ACIDOPHILUS / LACTOBACILLUS BULGARICUS 1 EACH: 100 MILLION CFU STRENGTH GRANULES at 08:11

## 2020-11-02 RX ADMIN — DEXAMETHASONE 6 MG: 4 TABLET ORAL at 08:11

## 2020-11-02 RX ADMIN — CYANOCOBALAMIN TAB 250 MCG 250 MCG: 250 TAB at 08:11

## 2020-11-02 RX ADMIN — IPRATROPIUM BROMIDE AND ALBUTEROL SULFATE 3 ML: .5; 2.5 SOLUTION RESPIRATORY (INHALATION) at 12:11

## 2020-11-02 RX ADMIN — ENOXAPARIN SODIUM 80 MG: 80 INJECTION SUBCUTANEOUS at 10:11

## 2020-11-02 RX ADMIN — FOLIC ACID 1 MG: 1 TABLET ORAL at 08:11

## 2020-11-02 RX ADMIN — OXYCODONE HYDROCHLORIDE AND ACETAMINOPHEN 500 MG: 500 TABLET ORAL at 08:11

## 2020-11-02 RX ADMIN — AMLODIPINE BESYLATE 5 MG: 5 TABLET ORAL at 08:11

## 2020-11-02 RX ADMIN — IPRATROPIUM BROMIDE AND ALBUTEROL SULFATE 3 ML: .5; 2.5 SOLUTION RESPIRATORY (INHALATION) at 04:11

## 2020-11-02 RX ADMIN — IPRATROPIUM BROMIDE AND ALBUTEROL SULFATE 3 ML: .5; 2.5 SOLUTION RESPIRATORY (INHALATION) at 08:11

## 2020-11-02 NOTE — PLAN OF CARE
[facility]     Department of Hospital Medicine     [address] [place] [address]     [phone] after hours  [phone] fax       NURSING HOME ORDERS    11/02/2020    Admit to Nursing Home:     Skilled Bed                                                  Diagnoses:  Active Hospital Problems    Diagnosis  POA    *Pneumonia due to COVID-19 virus [U07.1, J12.89]  Yes    Dysthymia [F34.1]  Yes    Hyperlipidemia [E78.5]  Yes    Essential hypertension [I10]  Yes      Resolved Hospital Problems    Diagnosis Date Resolved POA    Acute hypoxemic respiratory failure [J96.01] 11/02/2020 Yes       Patient is homebound due to:  Pneumonia due to COVID-19 virus    Allergies:Review of patient's allergies indicates:  No Known Allergies    Vitals:    Every shift (Skilled Nursing patients)    Diet: Adult Regular    Acitivities:     - Up in a chair each morning as tolerated   - May use walker    LABS:  Per facility protocol    Nursing Precautions:     - Aspiration precautions:             -  Upright 90 degrees before, during and after meals      - Fall precautions per nursing home protocol   - Decubitus precautions:        -  for positioning   - Pressure reducing foam mattress    CONSULTS:       Physical Therapy to evaluate and treat     Occupational Therapy to evaluate and treat     Nutrition to evaluate and recommend diet       Medications: Discontinue all previous medication orders, if any. See new list below.    Medications      amLODIPine (NORVASC) 5 MG tablet  Take 1 tablet (5 mg total) by mouth once daily.     ascorbic acid, vitamin C, (VITAMIN C) 500 MG tablet  Take 0.5 tablets (250 mg total) by mouth once daily.     atorvastatin (LIPITOR) 20 MG tablet  Take 1 tablet (20 mg total) by mouth once daily.     cyanocobalamin (VITAMIN B-12) 250 MCG tablet  Take 1 tablet (250 mcg total) by mouth once daily.     folic acid (FOLVITE) 1 MG tablet  Take 1 tablet (1 mg  total) by mouth once daily.     multivitamin Tab  Take 1 tablet by mouth once daily.     thiamine 100 MG tablet  Take 1 tablet (100 mg total) by mouth once daily.     venlafaxine (EFFEXOR) 37.5 MG Tab  Take 1 tablet (37.5 mg total) by mouth 2 (two) times daily. venlafaxine 37.5 mg tablet         Nuzhat Cerrato MD  11/02/2020

## 2020-11-02 NOTE — PLAN OF CARE
Problem: Adult Inpatient Plan of Care  Goal: Plan of Care Review  Outcome: Ongoing, Progressing  Goal: Patient-Specific Goal (Individualization)  Outcome: Ongoing, Progressing  Goal: Absence of Hospital-Acquired Illness or Injury  Outcome: Ongoing, Progressing  Goal: Optimal Comfort and Wellbeing  Outcome: Ongoing, Progressing  Goal: Readiness for Transition of Care  Outcome: Ongoing, Progressing  Goal: Rounds/Family Conference  Outcome: Ongoing, Progressing     Problem: Fall Injury Risk  Goal: Absence of Fall and Fall-Related Injury  Outcome: Ongoing, Progressing     Problem: Restraint, Nonbehavioral (Nonviolent)  Goal: Discontinuation Criteria Achieved  Outcome: Ongoing, Progressing  Goal: Personal Dignity and Safety Maintained  Outcome: Ongoing, Progressing     Problem: Skin Injury Risk Increased  Goal: Skin Health and Integrity  Outcome: Ongoing, Progressing     ASSUMED CARE OF PT. VSS NAD ASSESSMENT DONE/CHARTED. UNEVENTFUL SHIFT. REPORT GIVEN TO ONCOMING RN

## 2020-11-02 NOTE — PLAN OF CARE
11/02/20 1447   Post-Acute Status   Post-Acute Authorization Placement   Post-Acute Placement Status Set-up Complete     [facility]. SW set up transport for 3:40pm. SW set up transport via [facility] and patient will be transported by wheelchair.  Nurse call report to [phone]. Patient will admit to room B37. SW will contact patient son.         Shania León LMSW  [facility]   [phone]

## 2020-11-02 NOTE — PT/OT/SLP PROGRESS
Physical Therapy Treatment and Discharge     Patient Name:  Naty Hope   MRN:  [id number]    Recommendations:     Discharge Recommendations:  ( with 24/7 Supervision vs. Basic NH)   Discharge Equipment Recommendations: walker, rolling   Barriers to discharge: impaired safety awareness     Assessment:     Naty Hope is a 85 y.o. male admitted with a medical diagnosis of Pneumonia due to COVID-19 virus.  He presents with the following impairments/functional limitations:  decreased safety awareness. Pt treated on this date tolerating session well and focusing on gait training. Pt oriented x4 but still demonstrates impairments in safety awareness. Pt amb community distances >300ft on this date SBA with RW and has no further acute PT needs at this time. pt safe to mobilize with nursing, please re-consult if pt's functional status declines.       Recent Surgery: * No surgery found *      Plan:     During this hospitalization, patient to be seen 3 x/week to address the identified rehab impairments via gait training, therapeutic activities, therapeutic exercises, neuromuscular re-education and progress toward the following goals:    · Plan of Care Expires:  11/23/20    Subjective     Chief Complaint: Feels that staff have treated him like a child and limited him from ambulating freely in his room or on the unit   Patient/Family Comments/goals: Pt willing to participate with therapy on this date.    Pain/Comfort:  · Pain Rating 1: 0/10      Objective:     Communicated with NSG prior to session.  Patient found supine with   upon PT entry to room.     General Precautions: Standard, airborne, contact, droplet, fall   Orthopedic Precautions:    Braces:       Functional Mobility:  · Bed Mobility:     · Supine to Sit: modified independence  · Sit to Supine: modified independence  · Transfers:     · Sit to Stand:  modified independence with no AD  · Gait: 13ft x2 SBA without AD and >300ft SBA with RW. No LOB  noted but impaired safety awareness as pt visually scanned around the room more than focusing on ambulation path  · Balance: Sitting: Mod I      Standing: SBA      AM-PAC 6 CLICK MOBILITY  Turning over in bed (including adjusting bedclothes, sheets and blankets)?: 4  Sitting down on and standing up from a chair with arms (e.g., wheelchair, bedside commode, etc.): 4  Moving from lying on back to sitting on the side of the bed?: 4  Moving to and from a bed to a chair (including a wheelchair)?: 3  Need to walk in hospital room?: 3  Climbing 3-5 steps with a railing?: 3  Basic Mobility Total Score: 21       Therapeutic Activities and Exercises:   - Pt educated on:   -PT roles, expectations, and POC    -Safety with mobility   -Benefits of OOB activities to increase strength and functional mobility    -Performing ther ex for increasing LE ROM and strength   -Discharge recommendations     Patient left HOB elevated with call button in reach..    GOALS:   Multidisciplinary Problems     Physical Therapy Goals        Problem: Physical Therapy Goal    Goal Priority Disciplines Outcome Goal Variances Interventions   Physical Therapy Goal     PT, PT/OT Ongoing, Progressing     Description: Goals to be met by: 20    Patient will increase functional independence with mobility by performin. Supine to sit with Modified Dent -not met  2. Sit to stand transfer with Modified [place] -not met  3. Gait  x 250 feet with Supervision maintaining oxygen saturations -not met  4. Pt perform AROM therapeutic exercises x 15 reps BLE in sitting in increased tolerance to activities and increase functional mobility- not met                     Time Tracking:     PT Received On: 20  PT Start Time: 938     PT Stop Time: 1003  PT Total Time (min): 25 min     Billable Minutes: Gait Training 25    Treatment Type: Treatment  PT/PTA: PT     PTA Visit Number: 0     Viktor Marcelino, PT  2020

## 2020-11-02 NOTE — DISCHARGE SUMMARY
[facility] - ICU 15 [facility] Medicine  Telemedicine Discharge Summary      Patient Name: Naty Hope  MRN: [id number]  Admission Date: 10/24/2020  Hospital Length of Stay: 9 days  Discharge Date and Time:  11/02/2020 2:39 PM  Attending Physician: Nuzhat Cerrato MD   Discharging Provider: Nuzhat Cerrato MD  Primary Care Provider: [facility] Medicine Team: [facility] VIRTUAL TEAM 10 Nuzhat Cerrato MD  Patient was transferred to the telemedicine service on: 10/30/2020  This document was prepared by chart review and may not directly reflect my personal knowledge of the patient's case, clinical course, or significant events during the hospital stay.      HPI:  85-year-old m with a PMH chronic fatigue and HTN presented to [facility] via EMS for emergent evaluation of multiple near syncopal episodes and a fall.  Pt reports that he started to have symptoms including myalgias, cough, lightheadedness, fatigue starting 4 days ago. Was seen at an urgent care at that time. Was started on a course of azithromycin. The following day, about 3 days ago, he was called and told that the result of a SARSCOV2 test was positive. On the day of presentation he left his house for the first time in days, experienced near syncope and fell, scraping his right arm against a wall on the way down. A bystander called EMS. Pt reportedly incontinent of urine and stool en route. Pt denies loss of consciousness. Denies recent sick contacts. No fever, chills, nausea, diarrhea. Reports poor appetite over past several days. Has never had a similar event.   In the ED BP 86/69 T 99.1° HR 86 R 18 SpO2 82% (RA) GCS 15.  WBC 7.5 Hb 12.5 Na 134 Cr 1.6 BNP 27 LA 2.8 COVID pos.  CXR pos for infiltrate at the left base and probably at the right upper lung field. Patient given IVF 30 cc/kilogram and O2 4 L nasal catheter with improvement in BP to 120/58 and SpO2 to high 90s.  Patient also given dexamethasone 10 mg  IV.  CTH showed no acute changes. Also, BC x 2. Patient started on levofloxacin.       * No surgery found *      Hospital Course:    Naty Hope was admitted to Hospital Medicine for treatment of COVID-19 viral infection and was treated with supportive care following a comprehensive physical, radiographic, and lab evaluation tailored to the current standard of care for COVID19 at that time. Please review the admission H&P and the studies listed below for details. He improved with supportive care and was found to be suitable for discharge without oxygen.    Additional details of the hospitalization include:  Vital signs upon transfer arrival all normal except pt requiring 3 L NC. Started remdesivir 10/25. D-dimer elevated to 17 on 10/25. Started therapeutic Lovenox. Stopped Levaquin as no sufficient evidence of coinfection and concern for contribution to altered mental status. Started quetiapine for nighttime delirium as needed.  Son concerned about safety of family if pt returns home. Son does not want pt in son's home at this time as patient is infectious and nonadherent to safety measures, and likely deconditioned. Also concerned that they will not be able to provide as much supervision as needed at this time. Family would like SNF. Overall improved, medically stable for SNF.     COVID-19 Virus Infection:  Viral Pneumonia due to COVID-19:  -Pt tested for COVID-19 and noted to be positive on 10/24  -Isolation: Airborne/Droplet. Surgical mask on patient. Notify Infection Control  -[facility]  -Monitoring: Telemetry & Continuous Pulse Oximetry  -Antibiotics: s/p Levofloxacin 750 mg qd  -Nutrition:               -Continue Boost supplement              -Stopped Vitamin D as not deficient              -Ascorbic acid 500mg PO bid  -Supportive Care:              -Acetaminophen 650mg PO Q6hr PRN fever/headache              -Loperamide PRN viral  diarrhea              -Robitussin, Tessalon Perles for cough   -Investigational Therapies:  -Atorvastatin 40mg po daily              -Due to hypoxia, pt started on dexamethasone 6mg PO daily up to 10 days or until pt is discharged from hospital (whichever is sooner)              -Started remdesivir 10/25.  Completed.              -Full dose Lovenox for significantly elevated D-dimer; now improved, discontinuing Lovenox.     Acute Hypoxemic Respiratory Failure  -RT consult via Respiratory Communication for COVID Protocols  -If wheezing, albuterol INH Q6h scheduled & PRN  -Incentive Spirometer Q4h  -Flutter Valve Q4h  -Continuous pulse oximetry; titrate oxygen to keep sats between 92-96%  -Wean off O2 as tolerated    -Supplemental O2 via LFNC, VentiMask, or HFNC (see Respiratory Support Oxygen Therapies)  -Proning Protocol if patient is a candidate with GCS >13 and able to self-prone (see HM Proning Protocol)  -If deterioration, may warrant trial of NIPPV and transfer to [facility] pressure room or immediate ICU consult  -Wean O2.  Walk test ordered.     Acute encephalopathy   -suspect metabolic encephalopathy in setting of viral sepsis due to COVID-19.  -may have some underlying dementia given son's description of poor awareness of limitations and previous neglect of his health  -TSH, ammonia wnl  -supplementing B12, folate.  -thiamine level ordered and pending.  Oral thiamine supplementation started.  Per son on 10/31, patient is very near baseline.  However, patient is impulsive behavior led to his exposure to the novel coronavirus.  Patient will need 247 supervision throughout the rest of his isolation which ends 11/13.     Presyncope with fall at home  -orthostatics negative  -unlikely heart failure related as BNP completely normal  -Appreciate PT/OT recs - continue PT/OT     Macrocytic anemia  -continue B12 supplementation. Oral in place of home monthly injection  -start folate supplement given borderline low  level     HTN  -continue home amlodipine     Chronic fatigue syndrome, Dysthymia  -continue home venlafaxine       Consults:   Consults (From admission, onward)        Status Ordering Provider     Inpatient consult to Social Work  Once     Provider:  (Not yet assigned)    Acknowledged LAUREN LAMAR     Inpatient virtual consult to Hospital Medicine  Once     Provider:  (Not yet assigned)    Completed PHYLLIS ESCALONA     Pharmacy Remdesivir Consult  Once     Provider:  (Not yet assigned)    Acknowledged LAUREN LAMAR          Final Active Diagnoses:    Diagnosis Date Noted POA    PRINCIPAL PROBLEM:  Pneumonia due to COVID-19 virus [U07.1, J12.89] 10/24/2020 Yes    Dysthymia [F34.1] 10/30/2020 Yes    Hyperlipidemia [E78.5] 10/30/2020 Yes    Essential hypertension [I10] 10/30/2020 Yes      Problems Resolved During this Admission:    Diagnosis Date Noted Date Resolved POA    Acute hypoxemic respiratory failure [J96.01] 10/30/2020 11/02/2020 Yes      Discharged Condition: stable    Disposition: Skilled Nursing Facility    Follow Up:       Patient Instructions:      Diet Adult Regular     Notify your health care provider if you experience any of the following:  temperature >100.4     Notify your health care provider if you experience any of the following:  difficulty breathing or increased cough     Notify your health care provider if you experience any of the following:  persistent nausea and vomiting or diarrhea     Notify your health care provider if you experience any of the following:  severe persistent headache     Notify your health care provider if you experience any of the following:  increased confusion or weakness     Notify your health care provider if you experience any of the following:  persistent dizziness, light-headedness, or visual disturbances     COVID-19 Home Symptom Monitoring  - Duration (days): 14     Order Specific Question Answer Comments   Duration (days) 14      Activity as  tolerated     Medications:  Reconciled Home Medications:      Medication List      START taking these medications    ascorbic acid (vitamin C) 500 MG tablet  Commonly known as: VITAMIN C  Take 0.5 tablets (250 mg total) by mouth once daily.     folic acid 1 MG tablet  Commonly known as: FOLVITE  Take 1 tablet (1 mg total) by mouth once daily.  Start taking on: November 3, 2020     multivitamin Tab  Take 1 tablet by mouth once daily.     thiamine 100 MG tablet  Take 1 tablet (100 mg total) by mouth once daily.  Start taking on: November 3, 2020        CONTINUE taking these medications    amLODIPine 5 MG tablet  Commonly known as: NORVASC  Take 1 tablet (5 mg total) by mouth once daily.     atorvastatin 20 MG tablet  Commonly known as: LIPITOR  Take 1 tablet (20 mg total) by mouth once daily.     cyanocobalamin (vitamin B-12) 1,000 mcg/15 mL Liqd  cyanocobalamin (vit B-12) 1,000 mcg/mL injection solution   give 1 milliliter intramuscularly EVERY MONTH     venlafaxine 37.5 MG Tab  Commonly known as: EFFEXOR  Take 1 tablet (37.5 mg total) by mouth 2 (two) times daily. venlafaxine 37.5 mg tablet        STOP taking these medications    nystatin ointment  Commonly known as: MYCOSTATIN            Significant Diagnostic Studies:  Recent Labs   Lab 10/31/20  0405 11/01/20  0436 11/02/20  0301   WBC 5.59 7.38 8.68   HGB 11.7* 11.7* 11.8*   HCT 35.4* 35.0* 35.9*    224 267     Recent Labs   Lab 10/30/20  0941 10/31/20  0405 11/01/20  0436 11/02/20  0301   GRAN 87.0* 90.0* 88.0* 89.0*   BAND 2.0  --  4.0  --    LYMPH 9.0*  CANCELED 2.0*  CANCELED 2.0* 5.0*  CANCELED   MONO 1.0*  CANCELED 6.0  CANCELED 4.0 5.0  CANCELED   EOS CANCELED CANCELED  --  CANCELED     Recent Labs   Lab 10/31/20  0405 11/01/20  0436 11/02/20  0301    140 138   K 3.7 4.4 4.2    106 104   CO2 27 24 26   BUN 20 24* 27*   CREATININE 1.0 1.0 1.0   * 110 127*   CALCIUM 8.4* 9.1 9.7   ALBUMIN 3.1* 2.9* 3.0*   MG 2.2 2.3 2.3    PHOS 3.6 3.4 3.7     Recent Labs   Lab 10/31/20  0405 11/01/20  0436 11/02/20  0301   ALKPHOS 88 80 84   ALT 73* 60* 65*   AST 36 28 33   PROT 6.3 6.4 6.5   BILITOT 0.8 0.6 0.6     Procalcitonin (ng/mL)   Date Value   10/25/2020 0.07     Lactate (Lactic Acid) (mmol/L)   Date Value   10/24/2020 2.8 (H)   11/28/2013 4.0 (HH)     BNP (pg/mL)   Date Value   10/24/2020 27     CRP   Date Value   10/30/2020 14.4 mg/L (H)   10/29/2020 30.8 mg/L (H)   10/26/2020 29.6 mg/L (H)   10/25/2020 91.9 mg/L (H)   10/24/2020 9.21 mg/dL (H)     Sed Rate (mm/Hr)   Date Value   10/25/2020 42 (H)     D-Dimer (mg/L FEU)   Date Value   11/02/2020 1.76 (H)   10/30/2020 4.22 (H)   10/29/2020 2.45 (H)   10/26/2020 2.90 (H)   10/25/2020 17.12 (H)     Ferritin (ng/mL)   Date Value   11/02/2020 271   10/30/2020 304 (H)   10/29/2020 396 (H)   10/26/2020 490 (H)   10/25/2020 556 (H)   10/24/2020 574 (H)     LD (U/L)   Date Value   11/02/2020 351 (H)   10/30/2020 352 (H)   10/29/2020 394 (H)   10/26/2020 485 (H)   10/25/2020 454 (H)     Troponin I (ng/mL)   Date Value   10/24/2020 0.04   11/29/2013 <0.006   11/29/2013 0.008   11/28/2013 0.014     CPK (U/L)   Date Value   10/25/2020 132   10/24/2020 86   11/28/2013 46   11/28/2013 46     Results for orders placed or performed during the hospital encounter of 10/24/20   Vitamin D   Result Value Ref Range    Vit D, 25-Hydroxy 34 30 - 96 ng/mL     SARS-CoV-2 RNA, Amplification, Qual (no units)   Date Value   10/24/2020 Positive (A)       X-Ray Chest AP Portable  Narrative: EXAMINATION:  XR CHEST AP PORTABLE    CLINICAL HISTORY:  sob;    FINDINGS:  One view: Heart size is normal.  There are patchy infiltrates bilaterally.  This is worse in the right upper lobe compared to the prior study.  Impression: Worsening pulmonary infiltrates compared to 10/24/2020.  Possibilities include multifocal pneumonia, or asymmetric pulmonary edema.  Aspiration and sepsis less likely.    Electronically signed by: Romeo  MD Juan Carlos  Date:    10/30/2020  Time:    11:42      Pending Diagnostic Studies:     Procedure Component Value Units Date/Time    Vitamin B1 [[id number]] Collected: 10/31/20 0405    Order Status: Sent Lab Status: In process Updated: 10/31/20 0530    Specimen: Blood    Resulted as 48 - normal         Indwelling Lines/Drains at time of discharge:  None    Time spent on the discharge of patient: 50 minutes  Patient was seen and examined on the date of discharge and determined to be suitable for discharge.  Time was spent speaking with consultants and case management, reviewing records, and/or discussing the plan of care with patient/family.  Start time: 1444  Chief complaint: Pneumonia due to COVID-19 virus  [facility]  The patient arrived at: 10/24/2020  3:39 PM  Present with the patient at the time of the telemed/virtual assessment: n/a  End time:  1450  Total time spent with patient: 6 min  I have assessed findings virtually using a telemedicine platform and with assistance of the bedside nurse or telemedicine presenter.  The attending portion of this evaluation, treatment, and documentation was performed per Nuzhat Cerrato MD via audiovisual.        Nuzhat Cerrato MD  Department of Hospital Medicine  [facility] - ICU 15 [facility]

## 2020-11-03 LAB — VIT B1 BLD-MCNC: 48 UG/L (ref 38–122)

## 2020-11-03 NOTE — PLAN OF CARE
Patient medically ready for discharge to SKILLED NURSING FACILITY. Any necessary transport setup by SW. This CM scheduled or requested necessary follow-up appointments. Family/patient aware of discharge.    No future appointments. Will be scheduled upon discharge from SNF    Nicolle Machuca RN  Case Management  Ext: [phone]  11/02/2020 11/02/20 1842   Final Note   Assessment Type Final Discharge Note   Anticipated Discharge Disposition SNF   What phone number can be called within the next 1-3 days to see how you are doing after discharge? [phone]  (Wilfredo Hope (son))   Hospital Follow Up  Appt(s) scheduled? No  (to be scheduled upon discharge from SNF)   Discharge plans and expectations educations in teach back method with documentation complete? No  (report called to [facility] per bedside nurse / packet faxed per )   Right Care Referral Info   Post Acute Recommendation SNF / Sub-Acute Rehab   Referral Type Skilled Nursing facility   Facility Name [facility] [place]   Post-Acute Status   Post-Acute Authorization Placement   Post-Acute Placement Status Set-up Complete   Discharge Delays None known at this time

## 2020-11-03 NOTE — PHYSICIAN QUERY
PT Name: Naty Hope  MR #: [id number]     Documentation Clarification      CDS/: Lara Henning               Contact information:[email or web address]    This form is a permanent document in the medical record.     Query Date: November 3, 2020    By submitting this query, we are merely seeking further clarification of documentation. Please utilize your independent clinical judgment when addressing the question(s) below.    The Medical Record reflects the following:    Clinical Findings Location in Medical Records   suspect metabolic encephalopathy in setting of viral sepsis     Worsening pulmonary infiltrates compared to 10/24/2020.Possibilities include multifocal pneumonia, or asymmetric pulmonary edema.Aspiration and sepsis less likely.    Viral Pneumonia due to COVID-19  Acute Hypoxemic Respiratory Failure    HM PN 10/27 - 11/1    CXR 10/30        H&P 10/24     WBC 7.56 3.93 9.93 7.12 6.05 4.57 6.37 5.59 7.38 8.68       BANDS 2.0 4.0       CRP 9.21 (H) 91.9 (H) 29.6 (H) 14.4 (H)     Lactate = 2.8  Procal = 0.07    Temp = 98.2--->97.2--->98.6--->96.9--->98.6    HR = 68--->97--->57--->99--->73    VP=876/74--->150/78--->124/87--->164/80--->116/65--->142/77--->106/63--->113/64    Levaquin 750mg PO daily    Remdesivir IV   Labs 10/24 - 11/2      Labs 10/30 - 11/1      Labs 10/24 - 10/30      Labs 10/24  Labs 10/25    V/S Flowsheet 10/24 - 11/2              MAR 10/25 - 10/26    MAR 10/25 - 10/29                                                                            Provider, please clarify the diagnosis of _viral sepsis_:      [  x  ] Viral sepsis is confirmed and additional clinical support/decision-making indicators for the diagnosis include (please specify):_pulmonary infiltrates, procal low_______________   [   ] Viral sepsis is not confirmed and/or it has been ruled out   [   ] Viral sepsis is not confirmed and/or it has been ruled out, other diagnosis ruled in (please  specify):_______________   [   ] Other clarification (please specify): ___________________   [  ] Clinically undetermined

## 2023-03-06 NOTE — PROGRESS NOTES
Hospital Medicine   Progress note   Team: [facility] [place] [facility] MED R Nathanael Hamilton MD   Admit Date: 10/24/2020   CHECO    Code status: Full Code   Principal Problem: Pneumonia due to COVID-19 virus     Hospital Course:  Mr Naty Hope is an 85-year-old m with a PMH chronic fatigue and HTN presented to [facility] via EMS for emergent evaluation of multiple near syncopal episodes and a fall.  Pt reports that he started to have symptoms including myalgias, cough, lightheadedness, fatigue starting 4 days ago. Was seen at an urgent care at that time. Was started on a course of azithromycin. The following day, about 3 days ago, he was called and told that the result of a SARSCOV2 test was positive. On the day of presentation he left his house for the first time in days, experienced near syncope and fell, scraping his right arm against a wall on the way down. A bystander called EMS. Pt reportedly incontinent of urine and stool en route. Pt denies loss of consciousness. Denies recent sick contacts. No fever, chills, nausea, diarrhea. Reports poor appetite over past several days. Has never had a similar event.      In the ED BP 86/69 T 99.1° HR 86 R 18 SpO2 82% (RA) GCS 15.  WBC 7.5 Hb 12.5 Na 134 Cr 1.6 BNP 27 LA 2.8 COVID pos.  CXR pos for infiltrate at the left base and probably at the right upper lung field. Patient given IVF 30 cc/kilogram and O2 4 L nasal catheter with improvement in BP to 120/58 and SpO2 to high 90s.  Patient also given dexamethasone 10 mg IV.  CTH showed no acute changes. Also, BC x 2. Patient started on levofloxacin.      Vital signs upon transfer arrival all normal except pt requiring 3 L NC. Pt comfortable appearing at this time.     Interval hx:   Pt desatted to high 80's overnight while on 1.5 L NC. Back up to 3.5 LPM am. More lymphopenic on labs today. D-dimer elevated to 17. Starting therapeutic lovenox. Creatinine improved from 1.6 to 1.1. Increased levaquin frequency given updated  Cr clearance. Folate borderline low. Starting folate supplementation. Nurse reported that pt was delirious this am and removed his NC and pulse ox. Pt confirms confusion but is now alert and oriented. States he is feeling better. Appears comfortable on RA at time of exam.     ROS   Constitutional: fever, malaise, weakness  CV: No chest pain, edema, palpitations  Resp: SOB, cough. No sputum production  GI: No changes in appetite, NVDC, pain, melena, hematochezia, GERD, hematemesis  : No Dysuria, hematuria, urinary urgency, frequency  MSK: No arthralgia/myalgia, joint swelling  Neuro/Psych: No anxiety, depression    PEx   Temp:  [97.6 °F (36.4 °C)-99.4 °F (37.4 °C)]   Pulse:  [64-94]   Resp:  [16-28]   BP: ()/(58-80)   SpO2:  [82 %-98 %]      I & O (Last 24H):     Intake/Output Summary (Last 24 hours) at 10/25/2020 0935  Last data filed at 10/24/2020 1800  Gross per 24 hour   Intake --   Output 400 ml   Net -400 ml       General appearance: no distress  Mental status: Alert and oriented x 3  HEENT:  conjunctivae/corneas clear, PERRL  Neck: supple, thyroid not enlarged  Pulm:   normal respiratory effort. Coarse sounds over lower lung fields. Comfortable on RA.    Card: RRR, S1, S2 normal, no murmur, click, rub or gallop  Abd: soft, NT, ND, BS present; no masses, no organomegaly  Ext: no c/c/e. Missing parts of 2 digits on L hand.   Pulses: 2+, symmetric  Skin: color, texture, turgor normal. Abrasions over R forearm.   Neuro: Cranial Nerves grossly intact, no focal numbness or weakness, normal strength and tone     Recent Results (from the past 24 hour(s))   CBC auto differential    Collection Time: 10/24/20 10:25 AM   Result Value Ref Range    WBC 7.56 3.90 - 12.70 K/uL    RBC 3.73 (L) 4.60 - 6.20 M/uL    Hemoglobin 12.5 (L) 14.0 - 18.0 g/dL    Hematocrit 37.1 (L) 40.0 - 54.0 %    Mean Corpuscular Volume 100 (H) 82 - 98 fL    Mean Corpuscular Hemoglobin 33.5 (H) 27.0 - 31.0 pg    Mean Corpuscular Hemoglobin  Conc 33.7 32.0 - 36.0 g/dL    RDW 13.1 11.5 - 14.5 %    Platelets 201 150 - 350 K/uL    MPV 10.4 9.2 - 12.9 fL    Immature Granulocytes 1.1 (H) 0.0 - 0.5 %    Gran # (ANC) 6.3 1.8 - 7.7 K/uL    Immature Grans (Abs) 0.08 (H) 0.00 - 0.04 K/uL    Lymph # 0.7 (L) 1.0 - 4.8 K/uL    Mono # 0.5 0.3 - 1.0 K/uL    Eos # 0.1 0.0 - 0.5 K/uL    Baso # 0.02 0.00 - 0.20 K/uL    nRBC 0 0 /100 WBC    Gran% 82.8 (H) 38.0 - 73.0 %    Lymph% 8.6 (L) 18.0 - 48.0 %    Mono% 6.3 4.0 - 15.0 %    Eosinophil% 0.9 0.0 - 8.0 %    Basophil% 0.3 0.0 - 1.9 %    Differential Method Automated    Comprehensive metabolic panel    Collection Time: 10/24/20 10:25 AM   Result Value Ref Range    Sodium 134 (L) 136 - 145 mmol/L    Potassium 3.7 3.5 - 5.1 mmol/L    Chloride 99 95 - 110 mmol/L    CO2 22 (L) 23 - 29 mmol/L    Glucose 151 (H) 70 - 110 mg/dL    BUN, Bld 24 (H) 8 - 23 mg/dL    Creatinine 1.6 (H) 0.5 - 1.4 mg/dL    Calcium 9.1 8.7 - 10.5 mg/dL    Total Protein 7.4 6.0 - 8.4 g/dL    Albumin 3.8 3.5 - 5.2 g/dL    Total Bilirubin 1.2 (H) 0.1 - 1.0 mg/dL    Alkaline Phosphatase 57 55 - 135 U/L    AST 86 (H) 10 - 40 U/L    ALT 67 (H) 10 - 44 U/L    Anion Gap 13 8 - 16 mmol/L    eGFR if African American 45.4 (A) >60 mL/min/1.73 m^2    eGFR if non  39.3 (A) >60 mL/min/1.73 m^2   APTT    Collection Time: 10/24/20 10:25 AM   Result Value Ref Range    aPTT 29.5 21.0 - 32.0 sec   Protime-INR    Collection Time: 10/24/20 10:25 AM   Result Value Ref Range    Prothrombin Time 10.8 9.0 - 12.5 sec    INR 1.1 0.8 - 1.2   Troponin I    Collection Time: 10/24/20 10:25 AM   Result Value Ref Range    Troponin I 0.04 0.02 - 0.50 ng/mL   Brain natriuretic peptide    Collection Time: 10/24/20 10:25 AM   Result Value Ref Range    BNP 27 0 - 99 pg/mL   CK-MB    Collection Time: 10/24/20 10:25 AM   Result Value Ref Range    CPK 86 20 - 200 U/L    CPK MB 2.0 0.1 - 6.5 ng/mL    MB% 2.3 0.0 - 5.0 %   C-Reactive Protein    Collection Time: 10/24/20 10:25 AM    Result Value Ref Range    CRP 9.21 (H) 0.00 - 0.75 mg/dL   Blood culture x two cultures. Draw prior to antibiotics.    Collection Time: 10/24/20 10:36 AM    Specimen: Peripheral, Antecubital, Left; Blood   Result Value Ref Range    Blood Culture, Routine No Growth to date    COVID-19 Rapid Screening    Collection Time: 10/24/20 10:39 AM   Result Value Ref Range    SARS-CoV-2 RNA, Amplification, Qual Positive (A) Negative   Blood culture x two cultures. Draw prior to antibiotics.    Collection Time: 10/24/20 10:46 AM    Specimen: Blood   Result Value Ref Range    Blood Culture, Routine No Growth to date    Lactic acid, plasma    Collection Time: 10/24/20 11:18 AM   Result Value Ref Range    Lactate (Lactic Acid) 2.8 (H) 0.5 - 2.2 mmol/L   Ferritin    Collection Time: 10/24/20 11:25 AM   Result Value Ref Range    Ferritin 574 (H) 20.0 - 300.0 ng/mL   Urinalysis, Reflex to Urine Culture Urine, Clean Catch    Collection Time: 10/24/20 12:37 PM    Specimen: Urine   Result Value Ref Range    Specimen UA Urine, Clean Catch     Color, UA Yellow Yellow, Straw, Lara    Appearance, UA Clear Clear    pH, UA 7.0 5.0 - 8.0    Specific Gravity, UA 1.015 1.005 - 1.030    Protein, UA 1+ (A) Negative    Glucose, UA Negative Negative    Ketones, UA Negative Negative    Bilirubin (UA) Negative Negative    Occult Blood UA Negative Negative    Nitrite, UA Negative Negative    Urobilinogen, UA 1.0 Negative EU/dL    Leukocytes, UA Negative Negative   Urinalysis Microscopic    Collection Time: 10/24/20 12:37 PM   Result Value Ref Range    RBC, UA 10 (H) 0 - 4 /hpf    WBC, UA 6 (H) 0 - 5 /hpf    Bacteria Few (A) None-Occ /hpf    Squam Epithel, UA Few /hpf    Hyaline Casts, UA Moderate (A) 0-1/lpf /lpf    Microscopic Comment SEE COMMENT    C-Reactive Protein    Collection Time: 10/25/20 12:54 AM   Result Value Ref Range    CRP 91.9 (H) 0.0 - 8.2 mg/L   Sedimentation rate    Collection Time: 10/25/20 12:54 AM   Result Value Ref Range    Sed  Rate 42 (H) 0 - 23 mm/Hr   Lactate Dehydrogenase    Collection Time: 10/25/20 12:54 AM   Result Value Ref Range     (H) 110 - 260 U/L   Ferritin    Collection Time: 10/25/20 12:54 AM   Result Value Ref Range    Ferritin 556 (H) 20.0 - 300.0 ng/mL   Procalcitonin    Collection Time: 10/25/20 12:54 AM   Result Value Ref Range    Procalcitonin 0.07 <0.25 ng/mL   CK    Collection Time: 10/25/20 12:54 AM   Result Value Ref Range     20 - 200 U/L   D-Dimer, Quantitative    Collection Time: 10/25/20 12:54 AM   Result Value Ref Range    D-Dimer 17.12 (H) <0.50 mg/L FEU   Comprehensive Metabolic Panel (CMP)    Collection Time: 10/25/20  2:53 AM   Result Value Ref Range    Sodium 139 136 - 145 mmol/L    Potassium 4.1 3.5 - 5.1 mmol/L    Chloride 107 95 - 110 mmol/L    CO2 21 (L) 23 - 29 mmol/L    Glucose 146 (H) 70 - 110 mg/dL    BUN, Bld 23 8 - 23 mg/dL    Creatinine 1.1 0.5 - 1.4 mg/dL    Calcium 8.8 8.7 - 10.5 mg/dL    Total Protein 6.4 6.0 - 8.4 g/dL    Albumin 3.0 (L) 3.5 - 5.2 g/dL    Total Bilirubin 0.7 0.1 - 1.0 mg/dL    Alkaline Phosphatase 62 55 - 135 U/L    AST 89 (H) 10 - 40 U/L    ALT 81 (H) 10 - 44 U/L    Anion Gap 11 8 - 16 mmol/L    eGFR if African American >60.0 >60 mL/min/1.73 m^2    eGFR if non African American >60.0 >60 mL/min/1.73 m^2   Magnesium    Collection Time: 10/25/20  2:53 AM   Result Value Ref Range    Magnesium 2.2 1.6 - 2.6 mg/dL   Phosphorus    Collection Time: 10/25/20  2:53 AM   Result Value Ref Range    Phosphorus 3.4 2.7 - 4.5 mg/dL   CBC with Automated Differential    Collection Time: 10/25/20  2:53 AM   Result Value Ref Range    WBC 3.93 3.90 - 12.70 K/uL    RBC 3.36 (L) 4.60 - 6.20 M/uL    Hemoglobin 11.3 (L) 14.0 - 18.0 g/dL    Hematocrit 33.3 (L) 40.0 - 54.0 %    Mean Corpuscular Volume 99 (H) 82 - 98 fL    Mean Corpuscular Hemoglobin 33.6 (H) 27.0 - 31.0 pg    Mean Corpuscular Hemoglobin Conc 33.9 32.0 - 36.0 g/dL    RDW 12.6 11.5 - 14.5 %    Platelets 146 (L) 150 - 350  K/uL    MPV 10.6 9.2 - 12.9 fL    Immature Granulocytes 1.3 (H) 0.0 - 0.5 %    Gran # (ANC) 3.3 1.8 - 7.7 K/uL    Immature Grans (Abs) 0.05 (H) 0.00 - 0.04 K/uL    Lymph # 0.3 (L) 1.0 - 4.8 K/uL    Mono # 0.2 (L) 0.3 - 1.0 K/uL    Eos # 0.0 0.0 - 0.5 K/uL    Baso # 0.01 0.00 - 0.20 K/uL    nRBC 0 0 /100 WBC    Gran% 85.0 (H) 38.0 - 73.0 %    Lymph% 8.1 (L) 18.0 - 48.0 %    Mono% 5.3 4.0 - 15.0 %    Eosinophil% 0.0 0.0 - 8.0 %    Basophil% 0.3 0.0 - 1.9 %    Platelet Estimate Appears normal     Differential Method Automated    Folate    Collection Time: 10/25/20  2:53 AM   Result Value Ref Range    Folate 6.9 4.0 - 24.0 ng/mL   Vitamin D    Collection Time: 10/25/20  2:53 AM   Result Value Ref Range    Vit D, 25-Hydroxy 34 30 - 96 ng/mL       No results for input(s): POCTGLUCOSE in the last 168 hours.    No results found for: HGBA1C     Active Hospital Problems    Diagnosis  POA    *Pneumonia due to COVID-19 virus [U07.1, J12.89]  Yes      Resolved Hospital Problems   No resolved problems to display.        Overview:    Assessment and Plan for problems addressed today:   COVID-19 Virus Infection:  Viral Pneumonia due to COVID-19:  -Pt tested for COVID-19 and noted to be positive on 10/24  -Isolation: Airborne/Droplet. Surgical mask on patient. Notify Infection Control  -Management: per [facility] COVID Treatment Protocol (4/15/20)  -Monitoring: Telemetry & Continuous Pulse Oximetry  -Antibiotics: Continue levofloxacin 750 mg qd  -Nutrition:               -Continue Boost supplement              -Stopped Vitamin D as not deficient              -Ascorbic acid 500mg PO bid  -Supportive Care:              -Acetaminophen 650mg PO Q6hr PRN fever/headache              -Loperamide PRN viral diarrhea              -Robitussin, tessalon perls for cough   -Investigational Therapies:  -Atorvastatin 40mg po daily              -Due to hypoxia, pt started on dexamethasone 6mg PO daily up to 10 days or until pt is discharged from  hospital (whichever is sooner)              -Pt meets criteria for remdesivir. Pt provided verbal consent to start this medication and pharmacy consulted.      Acute Hypoxemic Respiratory Failure  -RT consult via Respiratory Communication for COVID Protocols  -If wheezing, albuterol INH Q6h scheduled & PRN  -Incentive Spirometer Q4h  -Flutter Valve Q4h  -Continuous pulse oximetry; titrate oxygen to keep sats between 92-96%  -Wean off O2 as tolerated    -Supplemental O2 via LFNC, VentiMask, or HFNC (see Respiratory Support Oxygen Therapies)  -Proning Protocol if patient is a candidate with GCS >13 and able to self-prone (see HM Proning Protocol)  -If deterioration, may warrant trial of NIPPV and transfer to [facility] pressure room or immediate ICU consult     Presyncope with fall  -f/u orthostatics  -unlikely heart failure related as BNP completely normal  -continuous cardiac monitoring for now  -Appreciate PT/OT recs- home PT/OT     Macrocytic anemia  -c/w b12 supplementation. Oral in place of home monthly injection  -start folate supplement given borderline low level     HTN  -c/w home amlodipine     Chronic fatigue syndrome  -c/w home venlafaxine      Goals of care, counseling/discussion  -Reviewed the typical clinical course of COVID19 with patient, including the potential for acute decompensation requiring intubation and mechanical ventilation  -Pt currently maintains code status of full code. Pt is going to reflect on this and determine if he wishes to change status.         VTE High Risk Prophylaxis: therapeutic lovenox    Discharge Planning   CHECO: 10/27/2020     Code Status: Full Code   Is the patient medically ready for discharge?: No    Reason for patient still in hospital (select all that apply): Patient unstable, Patient trending condition, Treatment and PT / OT recommendations           Nathanael Hamilton MD  [facility] Medicine Staff  [phone] Pager         Never smoker